# Patient Record
Sex: MALE | Race: WHITE | NOT HISPANIC OR LATINO | Employment: OTHER | URBAN - METROPOLITAN AREA
[De-identification: names, ages, dates, MRNs, and addresses within clinical notes are randomized per-mention and may not be internally consistent; named-entity substitution may affect disease eponyms.]

---

## 2017-05-05 ENCOUNTER — OFFICE VISIT (OUTPATIENT)
Dept: INTERNAL MEDICINE | Facility: CLINIC | Age: 62
End: 2017-05-05

## 2017-05-05 VITALS
WEIGHT: 237 LBS | TEMPERATURE: 98.1 F | BODY MASS INDEX: 29.47 KG/M2 | HEART RATE: 90 BPM | RESPIRATION RATE: 16 BRPM | DIASTOLIC BLOOD PRESSURE: 90 MMHG | SYSTOLIC BLOOD PRESSURE: 150 MMHG | HEIGHT: 75 IN

## 2017-05-05 DIAGNOSIS — Z71.85 IMMUNIZATION COUNSELING: ICD-10-CM

## 2017-05-05 DIAGNOSIS — M79.672 LEFT FOOT PAIN: ICD-10-CM

## 2017-05-05 DIAGNOSIS — Z79.899 ENCOUNTER FOR LONG-TERM (CURRENT) USE OF HIGH-RISK MEDICATION: ICD-10-CM
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
## 2017-05-05 DIAGNOSIS — F41.9 ANXIETY: ICD-10-CM

## 2017-05-05 DIAGNOSIS — M06.9 RHEUMATOID ARTHRITIS, INVOLVING UNSPECIFIED SITE, UNSPECIFIED RHEUMATOID FACTOR PRESENCE: ICD-10-CM

## 2017-05-05 DIAGNOSIS — IMO0001 ELEVATED BLOOD PRESSURE: ICD-10-CM

## 2017-05-05 DIAGNOSIS — L55.9 SUNBURN: ICD-10-CM

## 2017-05-05 DIAGNOSIS — M79.7 FIBROMYALGIA: ICD-10-CM

## 2017-05-05 DIAGNOSIS — Z23 IMMUNIZATION DUE: ICD-10-CM

## 2017-05-05 DIAGNOSIS — F32.A DEPRESSION, UNSPECIFIED DEPRESSION TYPE: ICD-10-CM

## 2017-05-05 DIAGNOSIS — G44.309 POST-TRAUMATIC HEADACHE, NOT INTRACTABLE, UNSPECIFIED CHRONICITY PATTERN: ICD-10-CM

## 2017-05-05 DIAGNOSIS — Z11.59 NEED FOR HEPATITIS C SCREENING TEST: ICD-10-CM

## 2017-05-05 DIAGNOSIS — E66.9 OBESITY, UNSPECIFIED OBESITY SEVERITY, UNSPECIFIED OBESITY TYPE: Primary | ICD-10-CM

## 2017-05-05 DIAGNOSIS — M54.5 CHRONIC LOW BACK PAIN, UNSPECIFIED BACK PAIN LATERALITY, WITH SCIATICA PRESENCE UNSPECIFIED: ICD-10-CM

## 2017-05-05 DIAGNOSIS — G89.29 CHRONIC LOW BACK PAIN, UNSPECIFIED BACK PAIN LATERALITY, WITH SCIATICA PRESENCE UNSPECIFIED: ICD-10-CM

## 2017-05-05 LAB
ALBUMIN SERPL-MCNC: 4.6 G/DL (ref 3.2–4.8)
ALBUMIN/GLOB SERPL: 1.4 G/DL (ref 1.5–2.5)
ALP SERPL-CCNC: 112 U/L (ref 25–100)
ALT SERPL W P-5'-P-CCNC: 87 U/L (ref 7–40)
ANION GAP SERPL CALCULATED.3IONS-SCNC: 9 MMOL/L (ref 3–11)
ARTICHOKE IGE QN: 132 MG/DL (ref 0–130)
AST SERPL-CCNC: 51 U/L (ref 0–33)
BASOPHILS # BLD AUTO: 0.02 10*3/MM3 (ref 0–0.2)
BASOPHILS NFR BLD AUTO: 0.2 % (ref 0–1)
BILIRUB BLD-MCNC: NEGATIVE MG/DL
BILIRUB SERPL-MCNC: 0.3 MG/DL (ref 0.3–1.2)
BUN BLD-MCNC: 13 MG/DL (ref 9–23)
BUN/CREAT SERPL: 14.4 (ref 7–25)
CALCIUM SPEC-SCNC: 10.3 MG/DL (ref 8.7–10.4)
CHLORIDE SERPL-SCNC: 96 MMOL/L (ref 99–109)
CHOLEST SERPL-MCNC: 204 MG/DL (ref 0–200)
CLARITY, POC: CLEAR
CO2 SERPL-SCNC: 34 MMOL/L (ref 20–31)
COLOR UR: YELLOW
CREAT BLD-MCNC: 0.9 MG/DL (ref 0.6–1.3)
DEPRECATED RDW RBC AUTO: 50.5 FL (ref 37–54)
EOSINOPHIL # BLD AUTO: 0.63 10*3/MM3 (ref 0.1–0.3)
EOSINOPHIL NFR BLD AUTO: 7 % (ref 0–3)
ERYTHROCYTE [DISTWIDTH] IN BLOOD BY AUTOMATED COUNT: 14.2 % (ref 11.3–14.5)
EXPIRATION DATE: NORMAL
GFR SERPL CREATININE-BSD FRML MDRD: 86 ML/MIN/1.73
GLOBULIN UR ELPH-MCNC: 3.3 GM/DL
GLUCOSE BLD-MCNC: 99 MG/DL (ref 70–100)
GLUCOSE UR STRIP-MCNC: NEGATIVE MG/DL
HCT VFR BLD AUTO: 41.3 % (ref 38.9–50.9)
HCV AB SER DONR QL: NORMAL
HDLC SERPL-MCNC: 40 MG/DL (ref 40–60)
HGB BLD-MCNC: 13.2 G/DL (ref 13.1–17.5)
IMM GRANULOCYTES # BLD: 0.02 10*3/MM3 (ref 0–0.03)
IMM GRANULOCYTES NFR BLD: 0.2 % (ref 0–0.6)
KETONES UR QL: NEGATIVE
LEUKOCYTE EST, POC: NEGATIVE
LYMPHOCYTES # BLD AUTO: 2.95 10*3/MM3 (ref 0.6–4.8)
LYMPHOCYTES NFR BLD AUTO: 32.9 % (ref 24–44)
Lab: NORMAL
MCH RBC QN AUTO: 31.4 PG (ref 27–31)
MCHC RBC AUTO-ENTMCNC: 32 G/DL (ref 32–36)
MCV RBC AUTO: 98.3 FL (ref 80–99)
MONOCYTES # BLD AUTO: 1.23 10*3/MM3 (ref 0–1)
MONOCYTES NFR BLD AUTO: 13.7 % (ref 0–12)
NEUTROPHILS # BLD AUTO: 4.12 10*3/MM3 (ref 1.5–8.3)
NEUTROPHILS NFR BLD AUTO: 46 % (ref 41–71)
NITRITE UR-MCNC: NEGATIVE MG/ML
PH UR: 5 [PH] (ref 5–8)
PLATELET # BLD AUTO: 387 10*3/MM3 (ref 150–450)
PMV BLD AUTO: 11.2 FL (ref 6–12)
POTASSIUM BLD-SCNC: 4.5 MMOL/L (ref 3.5–5.5)
PROT SERPL-MCNC: 7.9 G/DL (ref 5.7–8.2)
PROT UR STRIP-MCNC: NEGATIVE MG/DL
RBC # BLD AUTO: 4.2 10*6/MM3 (ref 4.2–5.76)
RBC # UR STRIP: NEGATIVE /UL
SODIUM BLD-SCNC: 139 MMOL/L (ref 132–146)
SP GR UR: 1.01 (ref 1–1.03)
TRIGL SERPL-MCNC: 383 MG/DL (ref 0–150)
UROBILINOGEN UR QL: NORMAL
WBC NRBC COR # BLD: 8.97 10*3/MM3 (ref 3.5–10.8)

## 2017-05-05 PROCEDURE — 86803 HEPATITIS C AB TEST: CPT | Performed by: NURSE PRACTITIONER

## 2017-05-05 PROCEDURE — 99406 BEHAV CHNG SMOKING 3-10 MIN: CPT | Performed by: NURSE PRACTITIONER

## 2017-05-05 PROCEDURE — 80053 COMPREHEN METABOLIC PANEL: CPT | Performed by: NURSE PRACTITIONER

## 2017-05-05 PROCEDURE — 81003 URINALYSIS AUTO W/O SCOPE: CPT | Performed by: NURSE PRACTITIONER

## 2017-05-05 PROCEDURE — G0009 ADMIN PNEUMOCOCCAL VACCINE: HCPCS | Performed by: NURSE PRACTITIONER

## 2017-05-05 PROCEDURE — 80061 LIPID PANEL: CPT | Performed by: NURSE PRACTITIONER

## 2017-05-05 PROCEDURE — 90732 PPSV23 VACC 2 YRS+ SUBQ/IM: CPT | Performed by: NURSE PRACTITIONER

## 2017-05-05 PROCEDURE — 85025 COMPLETE CBC W/AUTO DIFF WBC: CPT | Performed by: NURSE PRACTITIONER

## 2017-05-05 PROCEDURE — 36415 COLL VENOUS BLD VENIPUNCTURE: CPT | Performed by: NURSE PRACTITIONER

## 2017-05-05 PROCEDURE — 99205 OFFICE O/P NEW HI 60 MIN: CPT | Performed by: NURSE PRACTITIONER

## 2017-05-05 RX ORDER — PREGABALIN 200 MG/1
200 CAPSULE ORAL 3 TIMES DAILY
COMMUNITY
End: 2017-06-01 | Stop reason: SDUPTHER

## 2017-05-05 RX ORDER — ONDANSETRON HYDROCHLORIDE 8 MG/1
8 TABLET, FILM COATED ORAL EVERY 12 HOURS PRN
COMMUNITY
End: 2017-06-01 | Stop reason: SDUPTHER

## 2017-05-05 RX ORDER — OLANZAPINE 10 MG/1
10 TABLET ORAL DAILY
Refills: 0 | COMMUNITY
Start: 2017-05-03 | End: 2017-08-08 | Stop reason: SDUPTHER

## 2017-05-05 RX ORDER — AMLODIPINE BESYLATE 5 MG/1
5 TABLET ORAL DAILY
Refills: 0 | COMMUNITY
Start: 2017-05-03 | End: 2017-10-10 | Stop reason: SDUPTHER

## 2017-05-05 RX ORDER — IBUPROFEN 800 MG/1
800 TABLET ORAL DAILY
Refills: 0 | COMMUNITY
Start: 2017-05-03 | End: 2017-06-01 | Stop reason: SDUPTHER

## 2017-05-05 RX ORDER — AMITRIPTYLINE HYDROCHLORIDE 100 MG/1
100 TABLET, FILM COATED ORAL DAILY
Refills: 1 | COMMUNITY
Start: 2017-05-03 | End: 2017-11-02

## 2017-05-05 RX ORDER — GABAPENTIN 800 MG/1
800 TABLET ORAL DAILY
Refills: 0 | COMMUNITY
Start: 2017-05-03 | End: 2017-07-31 | Stop reason: SDUPTHER

## 2017-05-05 RX ORDER — TRAMADOL HYDROCHLORIDE 50 MG/1
50 TABLET ORAL DAILY
Refills: 0 | COMMUNITY
Start: 2017-05-03

## 2017-05-05 RX ORDER — OMEPRAZOLE 20 MG/1
20 CAPSULE, DELAYED RELEASE ORAL DAILY
Refills: 1 | COMMUNITY
Start: 2017-05-03 | End: 2018-04-06 | Stop reason: SDUPTHER

## 2017-05-05 RX ORDER — CELECOXIB 200 MG/1
200 CAPSULE ORAL DAILY
Refills: 1 | COMMUNITY
Start: 2017-05-03

## 2017-05-05 RX ORDER — TIZANIDINE 4 MG/1
4 TABLET ORAL DAILY
Refills: 0 | COMMUNITY
Start: 2017-05-03

## 2017-05-05 RX ORDER — HYDROCHLOROTHIAZIDE 25 MG/1
25 TABLET ORAL DAILY
Refills: 0 | COMMUNITY
Start: 2017-05-03 | End: 2017-06-01 | Stop reason: SDUPTHER

## 2017-05-05 RX ORDER — HYDROXYZINE 50 MG/1
50 TABLET, FILM COATED ORAL DAILY
Refills: 0 | COMMUNITY
Start: 2017-05-03 | End: 2018-04-02 | Stop reason: SDUPTHER

## 2017-05-08 ENCOUNTER — TELEPHONE (OUTPATIENT)
Dept: INTERNAL MEDICINE | Facility: CLINIC | Age: 62
End: 2017-05-08

## 2017-05-16 DIAGNOSIS — E78.5 HYPERLIPIDEMIA, UNSPECIFIED HYPERLIPIDEMIA TYPE: Primary | ICD-10-CM

## 2017-05-16 DIAGNOSIS — R74.8 ELEVATED LIVER ENZYMES: Primary | ICD-10-CM

## 2017-05-18 ENCOUNTER — HOSPITAL ENCOUNTER (OUTPATIENT)
Dept: CT IMAGING | Facility: HOSPITAL | Age: 62
End: 2017-05-18

## 2017-05-31 ENCOUNTER — TELEPHONE (OUTPATIENT)
Dept: INTERNAL MEDICINE | Facility: CLINIC | Age: 62
End: 2017-05-31

## 2017-06-01 RX ORDER — ONDANSETRON HYDROCHLORIDE 8 MG/1
8 TABLET, FILM COATED ORAL EVERY 12 HOURS PRN
Qty: 30 TABLET | Refills: 2 | Status: SHIPPED | OUTPATIENT
Start: 2017-06-01 | End: 2017-10-09 | Stop reason: SDUPTHER

## 2017-06-01 RX ORDER — HYDROCHLOROTHIAZIDE 25 MG/1
25 TABLET ORAL DAILY
Qty: 30 TABLET | Refills: 2 | Status: SHIPPED | OUTPATIENT
Start: 2017-06-01 | End: 2017-08-30 | Stop reason: SDUPTHER

## 2017-06-01 RX ORDER — IBUPROFEN 800 MG/1
800 TABLET ORAL DAILY
Qty: 30 TABLET | Refills: 2 | Status: SHIPPED | OUTPATIENT
Start: 2017-06-01 | End: 2017-06-08 | Stop reason: SDUPTHER

## 2017-06-01 RX ORDER — PREGABALIN 200 MG/1
200 CAPSULE ORAL 3 TIMES DAILY
Qty: 90 CAPSULE | Refills: 2 | Status: SHIPPED | OUTPATIENT
Start: 2017-06-01 | End: 2017-06-02 | Stop reason: SDUPTHER

## 2017-06-01 NOTE — TELEPHONE ENCOUNTER
LMOV for return call. Let pt know that Rx's were sent to requested pharm. Office # given if any questions.

## 2017-06-02 ENCOUNTER — TELEPHONE (OUTPATIENT)
Dept: INTERNAL MEDICINE | Facility: CLINIC | Age: 62
End: 2017-06-02

## 2017-06-02 RX ORDER — PREGABALIN 200 MG/1
200 CAPSULE ORAL 3 TIMES DAILY
Qty: 90 CAPSULE | Refills: 2 | OUTPATIENT
Start: 2017-06-02 | End: 2017-08-24 | Stop reason: SDUPTHER

## 2017-06-02 NOTE — TELEPHONE ENCOUNTER
----- Message from Allyssa Klein sent at 6/2/2017  8:35 AM EDT -----  [phone]    [facility] DOES NOT HAVE LYRICA-PLEASE RESEND

## 2017-06-02 NOTE — TELEPHONE ENCOUNTER
Rx called into pharm. Spoke with Yomaira. JUNIOR letting pt know. Office # given if any further questions.

## 2017-06-05 ENCOUNTER — APPOINTMENT (OUTPATIENT)
Dept: CT IMAGING | Facility: HOSPITAL | Age: 62
End: 2017-06-05

## 2017-06-08 ENCOUNTER — APPOINTMENT (OUTPATIENT)
Dept: CT IMAGING | Facility: HOSPITAL | Age: 62
End: 2017-06-08

## 2017-06-08 ENCOUNTER — TELEPHONE (OUTPATIENT)
Dept: INTERNAL MEDICINE | Facility: CLINIC | Age: 62
End: 2017-06-08

## 2017-06-08 RX ORDER — IBUPROFEN 800 MG/1
800 TABLET ORAL EVERY 8 HOURS PRN
Qty: 90 TABLET | Refills: 0 | Status: SHIPPED | OUTPATIENT
Start: 2017-06-08 | End: 2017-07-05 | Stop reason: SDUPTHER

## 2017-06-08 NOTE — TELEPHONE ENCOUNTER
Ibuprofen 800 mg 1tab 3 times a day when necessary pain.  Please ask that he take this medicine with food and caution and this should be on a temporary basis only as it can cause side effects of gastritis.  Numbers 90 no refills.

## 2017-06-08 NOTE — TELEPHONE ENCOUNTER
----- Message from Allyssa Klein sent at 6/8/2017  9:20 AM EDT -----  [phone]    NEEDS IBUPROFEN 300 MG 3X DAY.  RECENT RX WAS ONLY FOR 1X DAY AND HE IS OUT    [facility]

## 2017-07-05 RX ORDER — IBUPROFEN 800 MG/1
800 TABLET ORAL EVERY 8 HOURS PRN
Qty: 90 TABLET | Refills: 0 | Status: SHIPPED | OUTPATIENT
Start: 2017-07-05 | End: 2017-08-02 | Stop reason: SDUPTHER

## 2017-07-06 ENCOUNTER — OFFICE VISIT (OUTPATIENT)
Dept: INTERNAL MEDICINE | Facility: CLINIC | Age: 62
End: 2017-07-06

## 2017-07-06 VITALS
BODY MASS INDEX: 29.62 KG/M2 | TEMPERATURE: 97.7 F | RESPIRATION RATE: 16 BRPM | WEIGHT: 237 LBS | HEART RATE: 84 BPM | SYSTOLIC BLOOD PRESSURE: 130 MMHG | DIASTOLIC BLOOD PRESSURE: 86 MMHG

## 2017-07-06 DIAGNOSIS — R74.8 ELEVATED LIVER ENZYMES: ICD-10-CM

## 2017-07-06 DIAGNOSIS — R25.1 TREMORS OF NERVOUS SYSTEM: Primary | ICD-10-CM

## 2017-07-06 DIAGNOSIS — M79.672 LEFT FOOT PAIN: ICD-10-CM

## 2017-07-06 PROCEDURE — 99213 OFFICE O/P EST LOW 20 MIN: CPT | Performed by: NURSE PRACTITIONER

## 2017-07-06 NOTE — PROGRESS NOTES
Chief Complaint   Patient presents with   • Foot Injury     [facility] f/u        Subjective     History of Present Illness   The patient is here today for follow-up of her recent ER visit.  Patient did not remember that he was seen approximately one month ago for follow-up of left foot injury.  Records were reviewed and does have a fracture of his fifth metatarsal he has yet to see orthopedics because he says he can't get a ride.  He also reports that with orthopedics but they canceled due to the them having computer problems.  He would like pain medication today for his foot.  He reports he can take Motrin due to history of irritation in his stomach.  He has had orders for pain management however does not like pain management and does not want to go see them.      The following portions of the patient's history were reviewed and updated as appropriate: allergies, current medications, past family history, past medical history, past social history, past surgical history and problem list.    Review of Systems  No headache dizziness visual changes fever chest pain shortness of breath swelling no abdominal pain nausea vomiting diarrhea he does feel tremulous at times has pain in his left foot and chronic pain in his back no rashes no new lumps or bumps  Objective   Physical Exam   Constitutional: He is oriented to person, place, and time. He appears well-developed and well-nourished.   HENT:   Head: Normocephalic and atraumatic.   Neck: No thyromegaly present.   Cardiovascular: Normal rate, regular rhythm, normal heart sounds and intact distal pulses.    No murmur heard.  Pulmonary/Chest: Effort normal and breath sounds normal.   Abdominal: Soft. Bowel sounds are normal.   Musculoskeletal: Normal range of motion.   ttp Lateral 5th metatarsal L foot no erythema warmth edema   Neurological: He is alert and oriented to person, place, and time.   Skin: Skin is warm and dry.   Psychiatric: He has a normal mood and affect.  His behavior is normal.   Nursing note and vitals reviewed.      Results for orders placed or performed in visit on 05/05/17   Comprehensive Metabolic Panel   Result Value Ref Range    Glucose 99 70 - 100 mg/dL    BUN 13 9 - 23 mg/dL    Creatinine 0.90 0.60 - 1.30 mg/dL    Sodium 139 132 - 146 mmol/L    Potassium 4.5 3.5 - 5.5 mmol/L    Chloride 96 (L) 99 - 109 mmol/L    CO2 34.0 (H) 20.0 - 31.0 mmol/L    Calcium 10.3 8.7 - 10.4 mg/dL    Total Protein 7.9 5.7 - 8.2 g/dL    Albumin 4.60 3.20 - 4.80 g/dL    ALT (SGPT) 87 (H) 7 - 40 U/L    AST (SGOT) 51 (H) 0 - 33 U/L    Alkaline Phosphatase 112 (H) 25 - 100 U/L    Total Bilirubin 0.3 0.3 - 1.2 mg/dL    eGFR Non African Amer 86 >60 mL/min/1.73    Globulin 3.3 gm/dL    A/G Ratio 1.4 (L) 1.5 - 2.5 g/dL    BUN/Creatinine Ratio 14.4 7.0 - 25.0    Anion Gap 9.0 3.0 - 11.0 mmol/L   Lipid Panel   Result Value Ref Range    Total Cholesterol 204 (H) 0 - 200 mg/dL    Triglycerides 383 (H) 0 - 150 mg/dL    HDL Cholesterol 40 40 - 60 mg/dL    LDL Cholesterol  132 (H) 0 - 130 mg/dL   Hepatitis C Antibody   Result Value Ref Range    Hepatitis C Ab Non-Reactive Non-Reactive   CBC Auto Differential   Result Value Ref Range    WBC 8.97 3.50 - 10.80 10*3/mm3    RBC 4.20 4.20 - 5.76 10*6/mm3    Hemoglobin 13.2 13.1 - 17.5 g/dL    Hematocrit 41.3 38.9 - 50.9 %    MCV 98.3 80.0 - 99.0 fL    MCH 31.4 (H) 27.0 - 31.0 pg    MCHC 32.0 32.0 - 36.0 g/dL    RDW 14.2 11.3 - 14.5 %    RDW-SD 50.5 37.0 - 54.0 fl    MPV 11.2 6.0 - 12.0 fL    Platelets 387 150 - 450 10*3/mm3    Neutrophil % 46.0 41.0 - 71.0 %    Lymphocyte % 32.9 24.0 - 44.0 %    Monocyte % 13.7 (H) 0.0 - 12.0 %    Eosinophil % 7.0 (H) 0.0 - 3.0 %    Basophil % 0.2 0.0 - 1.0 %    Immature Grans % 0.2 0.0 - 0.6 %    Neutrophils, Absolute 4.12 1.50 - 8.30 10*3/mm3    Lymphocytes, Absolute 2.95 0.60 - 4.80 10*3/mm3    Monocytes, Absolute 1.23 (H) 0.00 - 1.00 10*3/mm3    Eosinophils, Absolute 0.63 (H) 0.10 - 0.30 10*3/mm3    Basophils,  Absolute 0.02 0.00 - 0.20 10*3/mm3    Immature Grans, Absolute 0.02 0.00 - 0.03 10*3/mm3   POC Urinalysis Dipstick, Automated   Result Value Ref Range    Color Yellow Yellow, Straw, Dark Yellow, Liberty    Clarity, UA Clear Clear    Glucose, UA Negative Negative, 1000 mg/dL (3+) mg/dL    Bilirubin Negative Negative    Ketones, UA Negative Negative    Specific Gravity  1.015 1.005 - 1.030    Blood, UA Negative Negative    pH, Urine 5.0 5.0 - 8.0    Protein, POC Negative Negative mg/dL    Urobilinogen, UA Normal Normal    Leukocytes Negative Negative    Nitrite, UA Negative Negative    Lot Number [id number]     Expiration Date 2282018         Assessment/Plan   Nathalia was seen today for foot injury.    Diagnoses and all orders for this visit:    Tremors of nervous system  -     Ambulatory Referral to Neurology    Elevated liver enzymes    Left foot pain  -     Ambulatory Referral to Orthopedic Surgery    note to have neurontin called in one month only      Follow up 3mo RTC/call  If symptoms worsen  Meds MOA and SE's reviewed and pt v/u

## 2017-07-20 ENCOUNTER — TELEPHONE (OUTPATIENT)
Dept: INTERNAL MEDICINE | Facility: CLINIC | Age: 62
End: 2017-07-20

## 2017-08-02 RX ORDER — IBUPROFEN 800 MG/1
800 TABLET ORAL EVERY 8 HOURS PRN
Qty: 90 TABLET | Refills: 0 | Status: SHIPPED | OUTPATIENT
Start: 2017-08-02 | End: 2017-08-30 | Stop reason: SDUPTHER

## 2017-08-03 DIAGNOSIS — Z79.899 ENCOUNTER FOR LONG-TERM (CURRENT) USE OF HIGH-RISK MEDICATION: Primary | ICD-10-CM

## 2017-08-03 RX ORDER — GABAPENTIN 800 MG/1
TABLET ORAL
Qty: 30 TABLET | Refills: 0 | Status: SHIPPED | OUTPATIENT
Start: 2017-08-03 | End: 2017-10-10

## 2017-08-03 NOTE — TELEPHONE ENCOUNTER
Spoke with Jaquelin at [facility]. States that pt filled #30 on 07/06/2017.     LMOV for return call. Office # given. Just need to know when pt sees Pain Management again.

## 2017-08-04 NOTE — TELEPHONE ENCOUNTER
LMOV for return call. Office # given. Need to let pt know that he will need to come in to office to sign CSA. Placed up front for pt to complete.     * Pt will also need UDS when he comes in to sign CSA.

## 2017-08-08 RX ORDER — OLANZAPINE 10 MG/1
10 TABLET ORAL DAILY
Qty: 30 TABLET | Refills: 2 | Status: SHIPPED | OUTPATIENT
Start: 2017-08-08 | End: 2017-10-10

## 2017-08-09 ENCOUNTER — TELEPHONE (OUTPATIENT)
Dept: INTERNAL MEDICINE | Facility: CLINIC | Age: 62
End: 2017-08-09
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
## 2017-08-09 NOTE — TELEPHONE ENCOUNTER
If it blistered then he may have a second degree burn which is going to be painful for 3-5 days.  Recommend using silvadene cream which is specifically for burns.  Stop calamine.  If there is expanding redness then he needs to be seen.  Keep clean and dry.

## 2017-08-14 ENCOUNTER — OFFICE VISIT (OUTPATIENT)
Dept: NEUROLOGY | Facility: CLINIC | Age: 62
End: 2017-08-14

## 2017-08-14 VITALS
WEIGHT: 231 LBS | HEIGHT: 75 IN | BODY MASS INDEX: 28.72 KG/M2 | DIASTOLIC BLOOD PRESSURE: 85 MMHG | SYSTOLIC BLOOD PRESSURE: 128 MMHG | HEART RATE: 96 BPM

## 2017-08-14 DIAGNOSIS — R25.1 OCCASIONAL TREMORS: Primary | ICD-10-CM

## 2017-08-14 PROCEDURE — 99203 OFFICE O/P NEW LOW 30 MIN: CPT | Performed by: PSYCHIATRY & NEUROLOGY

## 2017-08-14 NOTE — PROGRESS NOTES
Subjective:     Patient ID: Nathalia Jordan is a 62 y.o. male.    History of Present Illness     62 y.o. male referred by Lady Sanchez for tremors.  In last year developed intermittent tremors in left arm.  Trouble walking with left leg swinging outwards.  Taking Zyprexa for the last year and ran out two weeks ago.  Pain in feet and knees with walking.         The following portions of the patient's history were reviewed and updated as appropriate:   He  has a past medical history of Anxiety; Depression; Fibromyalgia, primary; Hypertension; and Rheumatoid arthritis.  He  does not have any pertinent problems on file.  He  has a past surgical history that includes Cyst Removal (Right, 1999).  His family history includes Stomach cancer in his mother.  He  reports that he quit smoking about 6 years ago. His smoking use included Cigarettes. He has a 10.00 pack-year smoking history. His smokeless tobacco use includes Snuff. He reports that he does not drink alcohol or use illicit drugs.  Current Outpatient Prescriptions   Medication Sig Dispense Refill   • amitriptyline (ELAVIL) 100 MG tablet Take 100 mg by mouth Daily.  1   • amLODIPine (NORVASC) 5 MG tablet Take 5 mg by mouth Daily.  0   • celecoxib (CeleBREX) 200 MG capsule Take 200 mg by mouth Daily.  1   • gabapentin (NEURONTIN) 800 MG tablet take 1 tablet by mouth once daily 30 tablet 0   • hydrochlorothiazide (HYDRODIURIL) 25 MG tablet Take 1 tablet by mouth Daily. 30 tablet 2   • hydrOXYzine (ATARAX) 50 MG tablet Take 50 mg by mouth Daily.  0   • ibuprofen (ADVIL,MOTRIN) 800 MG tablet Take 1 tablet by mouth Every 8 (Eight) Hours As Needed for Mild Pain (1-3). 90 tablet 0   • OLANZapine (zyPREXA) 10 MG tablet Take 1 tablet by mouth Daily. 30 tablet 2   • omeprazole (priLOSEC) 20 MG capsule Take 20 mg by mouth Daily.  1   • ondansetron (ZOFRAN) 8 MG tablet Take 1 tablet by mouth Every 12 (Twelve) Hours As Needed for Nausea or Vomiting. 30 tablet 2   • silver  sulfadiazine (SILVADENE, SSD) 1 % cream Apply  topically 2 (Two) Times a Day. 25 g 0   • tiZANidine (ZANAFLEX) 4 MG tablet Take 4 mg by mouth Daily.  0   • traMADol (ULTRAM) 50 MG tablet Take 50 mg by mouth Daily.  0   • LYRICA 200 MG capsule take 1 capsule by mouth three times a day 90 capsule 1     No current facility-administered medications for this visit.      Current Outpatient Prescriptions on File Prior to Visit   Medication Sig   • amitriptyline (ELAVIL) 100 MG tablet Take 100 mg by mouth Daily.   • amLODIPine (NORVASC) 5 MG tablet Take 5 mg by mouth Daily.   • celecoxib (CeleBREX) 200 MG capsule Take 200 mg by mouth Daily.   • gabapentin (NEURONTIN) 800 MG tablet take 1 tablet by mouth once daily   • hydrochlorothiazide (HYDRODIURIL) 25 MG tablet Take 1 tablet by mouth Daily.   • hydrOXYzine (ATARAX) 50 MG tablet Take 50 mg by mouth Daily.   • ibuprofen (ADVIL,MOTRIN) 800 MG tablet Take 1 tablet by mouth Every 8 (Eight) Hours As Needed for Mild Pain (1-3).   • OLANZapine (zyPREXA) 10 MG tablet Take 1 tablet by mouth Daily.   • omeprazole (priLOSEC) 20 MG capsule Take 20 mg by mouth Daily.   • ondansetron (ZOFRAN) 8 MG tablet Take 1 tablet by mouth Every 12 (Twelve) Hours As Needed for Nausea or Vomiting.   • silver sulfadiazine (SILVADENE, SSD) 1 % cream Apply  topically 2 (Two) Times a Day.   • tiZANidine (ZANAFLEX) 4 MG tablet Take 4 mg by mouth Daily.   • traMADol (ULTRAM) 50 MG tablet Take 50 mg by mouth Daily.     No current facility-administered medications on file prior to visit.      He has No Known Allergies..    Review of Systems   Constitutional: Negative for activity change, fatigue and unexpected weight change.   HENT: Negative for facial swelling, hearing loss, tinnitus, trouble swallowing and voice change.    Eyes: Negative for photophobia, pain and visual disturbance.   Respiratory: Negative for apnea, cough and choking.    Cardiovascular: Negative for chest pain.   Gastrointestinal:  "Negative for constipation, nausea and vomiting.   Endocrine: Negative for cold intolerance.   Genitourinary: Negative for difficulty urinating, frequency and urgency.   Musculoskeletal: Positive for arthralgias and back pain. Negative for gait problem, myalgias, neck pain and neck stiffness.   Skin: Negative for rash.   Allergic/Immunologic: Negative for immunocompromised state.   Neurological: Positive for tremors. Negative for dizziness, seizures, syncope, facial asymmetry, speech difficulty, weakness, light-headedness, numbness and headaches.   Hematological: Negative for adenopathy.   Psychiatric/Behavioral: Negative for confusion, decreased concentration, hallucinations and sleep disturbance. The patient is not nervous/anxious.         Objective:  Vitals:    08/14/17 1352   BP: 128/85   Pulse: 96   Weight: 231 lb (105 kg)   Height: 75\" (190.5 cm)       Neurologic Exam     Mental Status   Oriented to person, place, and time.   Registration: recalls 3 of 3 objects. Recall at 5 minutes: recalls 3 of 3 objects. Follows 3 step commands.   Attention: normal. Concentration: normal.   Speech: speech is normal   Level of consciousness: alert  Knowledge: good and consistent with education.   Able to name object. Able to read. Able to repeat. Able to write. Normal comprehension.     Cranial Nerves     CN II   Visual fields full to confrontation.   Visual acuity: normal  Right visual field deficit: none  Left visual field deficit: none     CN III, IV, VI   Pupils are equal, round, and reactive to light.  Extraocular motions are normal.   Right pupil: Shape: regular. Reactivity: brisk. Consensual response: intact.   Left pupil: Shape: regular. Reactivity: brisk. Consensual response: intact.   Nystagmus: none   Diplopia: none  Ophthalmoparesis: none  Upgaze: normal  Downgaze: normal  Conjugate gaze: present  Vestibulo-ocular reflex: present    CN V   Facial sensation intact.   Right corneal reflex: normal  Left corneal " reflex: normal    CN VII   Right facial weakness: none  Left facial weakness: none    CN VIII   Hearing: intact    CN IX, X   Palate: symmetric  Right gag reflex: normal  Left gag reflex: normal    CN XI   Right sternocleidomastoid strength: normal  Left sternocleidomastoid strength: normal    CN XII   Tongue: not atrophic  Fasciculations: absent  Tongue deviation: none       Decreased blink rate and masked face     Motor Exam   Muscle bulk: normal  Overall muscle tone: normal  Right arm tone: normal  Left arm tone: normal  Right leg tone: normal  Left leg tone: normal    Strength   Strength 5/5 throughout.     Sensory Exam   Light touch normal.   Vibration normal.   Proprioception normal.   Pinprick normal.     Gait, Coordination, and Reflexes     Gait  Gait: circumduction (left leg )    Coordination   Romberg: negative  Finger to nose coordination: normal  Heel to shin coordination: normal  Tandem walking coordination: abnormal    Tremor   Resting tremor: absent  Intention tremor: absent  Action tremor: absent    Reflexes   Reflexes 2+ except as noted.       Physical Exam   Constitutional: He is oriented to person, place, and time. Vital signs are normal. He appears well-developed and well-nourished.   HENT:   Head: Normocephalic and atraumatic.   Eyes: EOM and lids are normal. Pupils are equal, round, and reactive to light.   Fundoscopic exam:       The right eye shows no exudate, no hemorrhage and no papilledema. The right eye shows venous pulsations.        The left eye shows no exudate, no hemorrhage and no papilledema. The left eye shows venous pulsations.   Neck: Normal range of motion and phonation normal. Normal carotid pulses present. Carotid bruit is not present. No thyroid mass and no thyromegaly present.   Cardiovascular: Normal rate, regular rhythm and normal heart sounds.    Pulmonary/Chest: Effort normal.   Neurological: He is oriented to person, place, and time. He has normal strength. He has an  abnormal Tandem Gait Test. He has a normal Finger-Nose-Finger Test, a normal Heel to Simmons Test and a normal Romberg Test.   Skin: Skin is warm and dry.   Psychiatric: He has a normal mood and affect. His speech is normal.   Nursing note and vitals reviewed.      Assessment/Plan:       Problems Addressed this Visit        Other    Occasional tremors - Primary     Sx suggestive drug induced parkinsonism    Remain off Zyprexa

## 2017-08-30 RX ORDER — HYDROCHLOROTHIAZIDE 25 MG/1
25 TABLET ORAL DAILY
Qty: 30 TABLET | Refills: 2 | Status: SHIPPED | OUTPATIENT
Start: 2017-08-30 | End: 2017-10-09 | Stop reason: SDUPTHER

## 2017-08-30 RX ORDER — IBUPROFEN 800 MG/1
TABLET ORAL
Qty: 90 TABLET | Refills: 0 | Status: SHIPPED | OUTPATIENT
Start: 2017-08-30 | End: 2017-10-02 | Stop reason: SDUPTHER

## 2017-09-05 ENCOUNTER — TELEPHONE (OUTPATIENT)
Dept: INTERNAL MEDICINE | Facility: CLINIC | Age: 62
End: 2017-09-05

## 2017-09-05 NOTE — TELEPHONE ENCOUNTER
PT CALLED BACK-WAS TOLD HE NEEDED AN APPT BEFORE ANTIBIOTICS WOULD BE CALLED IN PER CANDACE-HE SAID HE DID NOT WANT TO GO OUT IN PUBLIC WITH HIS FACE SWOLLEN, THEN HE SAID THANK YOU AND HUNG UP

## 2017-09-05 NOTE — TELEPHONE ENCOUNTER
JUNIOR for return call. Office # given. Need to let him know that he will need to be seen before abx will be called in- Per Manju.

## 2017-09-05 NOTE — TELEPHONE ENCOUNTER
Pt informed that Abx will be sent tp pharm but he will have to take Motrin for pain control. Also informed that he will need to f/u with dentist, [facility] has programs if no dental insurance. Verbal understanding and great appreciation received.  Pt requested abx to be sent by 10:30am. I let him know that you are seeing pt's and would send in as soon as you are able to.       Pt requested rf on Gabapentin. I informed him that he will need to come in to office before it can be filled. Pt stated that he will try to come in sometime this month.    *Needs UDS.

## 2017-09-05 NOTE — TELEPHONE ENCOUNTER
----- Message from Silvina Freeman MA sent at 9/5/2017  9:03 AM EDT -----  Patient states he has two teeth bothering him (possibly abscessed) patient states he does not have dental insurance. Requesting an antibiotic.      [phone]

## 2017-09-06 ENCOUNTER — TELEPHONE (OUTPATIENT)
Dept: INTERNAL MEDICINE | Facility: CLINIC | Age: 62
End: 2017-09-06

## 2017-09-06 NOTE — TELEPHONE ENCOUNTER
Pt called back regarding this. He stated now his throat hurts and his chest hurts. He wanted to know if he could have an antibiotic called in now?

## 2017-09-06 NOTE — TELEPHONE ENCOUNTER
Patient had labs ordered on 5/16/2017. Reminder letter was mailed to patient 8/10/2017. Is it ok to cancel these orders? Please advise

## 2017-09-06 NOTE — TELEPHONE ENCOUNTER
CONNEROV letting pt know that he will need appt before abx will be sent to pharm. Office # given for return call to make appt.

## 2017-10-03 ENCOUNTER — TELEPHONE (OUTPATIENT)
Dept: INTERNAL MEDICINE | Facility: CLINIC | Age: 62
End: 2017-10-03

## 2017-10-03 RX ORDER — IBUPROFEN 800 MG/1
TABLET ORAL
Qty: 90 TABLET | Refills: 0 | Status: SHIPPED | OUTPATIENT
Start: 2017-10-03 | End: 2017-10-09 | Stop reason: SDUPTHER

## 2017-10-03 NOTE — TELEPHONE ENCOUNTER
----- Message from Fabiana Warren sent at 10/3/2017 12:27 PM EDT -----  Contact: SELF  WM PANG CALLING FOR A REFILL FOR IBUPROFEN 800 MG.  HE WOULD LIKE THIS SENT TO THE [facility] IN [place]  PH:[phone]    WM CAN BE REACHED -[phone]    HE SAID HE ONLY HAS 2 LEFT AND WOULD LIKE TO KNOW IF THIS COULD BE DONE TODAY, I HAVE NOTIFIED HIM THERE IS AN ALLOTTED 48 HOURS TO FILL A RX.

## 2017-10-03 NOTE — TELEPHONE ENCOUNTER
Okay to fill one month only patient was due to come back in office for recheck in October.  He also needs repeat check of his liver enzymes.

## 2017-10-09 ENCOUNTER — TELEPHONE (OUTPATIENT)
Dept: INTERNAL MEDICINE | Facility: CLINIC | Age: 62
End: 2017-10-09

## 2017-10-09 RX ORDER — HYDROCHLOROTHIAZIDE 25 MG/1
25 TABLET ORAL DAILY
Qty: 30 TABLET | Refills: 2 | Status: SHIPPED | OUTPATIENT
Start: 2017-10-09 | End: 2018-04-02 | Stop reason: SDUPTHER

## 2017-10-09 RX ORDER — IBUPROFEN 800 MG/1
800 TABLET ORAL EVERY 8 HOURS PRN
Qty: 90 TABLET | Refills: 0 | Status: SHIPPED | OUTPATIENT
Start: 2017-10-09 | End: 2017-11-02

## 2017-10-09 RX ORDER — ONDANSETRON HYDROCHLORIDE 8 MG/1
8 TABLET, FILM COATED ORAL EVERY 12 HOURS PRN
Qty: 30 TABLET | Refills: 2 | Status: SHIPPED | OUTPATIENT
Start: 2017-10-09

## 2017-10-09 RX ORDER — IBUPROFEN 800 MG/1
TABLET ORAL
Qty: 90 TABLET | Refills: 0 | OUTPATIENT
Start: 2017-10-09

## 2017-10-09 NOTE — TELEPHONE ENCOUNTER
----- Message from Fabiana Warren sent at 10/9/2017 10:02 AM EDT -----  Contact: SELF  WM PANG CALLING FOR REFILLS FOR HIS STOMACH PILL, IBUPROFEN AND BLOOD PRESSURE MEDICATION. HE USES THE [facility]. HE HAS ASKED IF THIS COULD BE CALLED IN SOON SO HE CAN GET IT TODAY WHILE HE HAS A RIDE. HE CAN BE REACHED [phone]

## 2017-10-09 NOTE — TELEPHONE ENCOUNTER
While the patient's controlled substance agreement, Keegan, urine drug screen are all up-to-date I was under the impression he was going to be following with pain management.  Consult was placed in May.  Please check on this with the patient he really should be seeing pain management for his pain control.

## 2017-10-10 ENCOUNTER — OFFICE VISIT (OUTPATIENT)
Dept: INTERNAL MEDICINE | Facility: CLINIC | Age: 62
End: 2017-10-10

## 2017-10-10 VITALS
SYSTOLIC BLOOD PRESSURE: 128 MMHG | BODY MASS INDEX: 29.62 KG/M2 | RESPIRATION RATE: 18 BRPM | HEART RATE: 72 BPM | TEMPERATURE: 97.9 F | WEIGHT: 237 LBS | DIASTOLIC BLOOD PRESSURE: 84 MMHG

## 2017-10-10 DIAGNOSIS — R74.8 ELEVATED LIVER ENZYMES: ICD-10-CM

## 2017-10-10 DIAGNOSIS — K21.9 GASTROESOPHAGEAL REFLUX DISEASE, ESOPHAGITIS PRESENCE NOT SPECIFIED: ICD-10-CM

## 2017-10-10 DIAGNOSIS — M54.5 CHRONIC LOW BACK PAIN, UNSPECIFIED BACK PAIN LATERALITY, WITH SCIATICA PRESENCE UNSPECIFIED: ICD-10-CM

## 2017-10-10 DIAGNOSIS — E66.9 OBESITY, UNSPECIFIED OBESITY SEVERITY, UNSPECIFIED OBESITY TYPE: ICD-10-CM

## 2017-10-10 DIAGNOSIS — I10 ESSENTIAL HYPERTENSION: ICD-10-CM

## 2017-10-10 DIAGNOSIS — F41.9 ANXIETY: ICD-10-CM

## 2017-10-10 DIAGNOSIS — G89.29 CHRONIC LOW BACK PAIN, UNSPECIFIED BACK PAIN LATERALITY, WITH SCIATICA PRESENCE UNSPECIFIED: ICD-10-CM

## 2017-10-10 DIAGNOSIS — Z79.899 LONG-TERM USE OF HIGH-RISK MEDICATION: Primary | ICD-10-CM

## 2017-10-10 LAB
ALBUMIN SERPL-MCNC: 4.3 G/DL (ref 3.2–4.8)
ALBUMIN/GLOB SERPL: 1.4 G/DL (ref 1.5–2.5)
ALP SERPL-CCNC: 89 U/L (ref 25–100)
ALT SERPL W P-5'-P-CCNC: 68 U/L (ref 7–40)
ANION GAP SERPL CALCULATED.3IONS-SCNC: 7 MMOL/L (ref 3–11)
ARTICHOKE IGE QN: 149 MG/DL (ref 0–130)
AST SERPL-CCNC: 37 U/L (ref 0–33)
BASOPHILS # BLD AUTO: 0.02 10*3/MM3 (ref 0–0.2)
BASOPHILS NFR BLD AUTO: 0.3 % (ref 0–1)
BILIRUB BLD-MCNC: NEGATIVE MG/DL
BILIRUB SERPL-MCNC: 0.3 MG/DL (ref 0.3–1.2)
BUN BLD-MCNC: 19 MG/DL (ref 9–23)
BUN/CREAT SERPL: 19 (ref 7–25)
CALCIUM SPEC-SCNC: 9.9 MG/DL (ref 8.7–10.4)
CHLORIDE SERPL-SCNC: 106 MMOL/L (ref 99–109)
CHOLEST SERPL-MCNC: 201 MG/DL (ref 0–200)
CLARITY, POC: CLEAR
CO2 SERPL-SCNC: 29 MMOL/L (ref 20–31)
COLOR UR: YELLOW
CREAT BLD-MCNC: 1 MG/DL (ref 0.6–1.3)
DEPRECATED RDW RBC AUTO: 49.8 FL (ref 37–54)
EOSINOPHIL # BLD AUTO: 0.52 10*3/MM3 (ref 0–0.3)
EOSINOPHIL NFR BLD AUTO: 6.6 % (ref 0–3)
ERYTHROCYTE [DISTWIDTH] IN BLOOD BY AUTOMATED COUNT: 14.7 % (ref 11.3–14.5)
EXPIRATION DATE: NORMAL
GFR SERPL CREATININE-BSD FRML MDRD: 76 ML/MIN/1.73
GLOBULIN UR ELPH-MCNC: 3.1 GM/DL
GLUCOSE BLD-MCNC: 101 MG/DL (ref 70–100)
GLUCOSE UR STRIP-MCNC: NEGATIVE MG/DL
HCT VFR BLD AUTO: 40.8 % (ref 38.9–50.9)
HDLC SERPL-MCNC: 48 MG/DL (ref 40–60)
HGB BLD-MCNC: 13.4 G/DL (ref 13.1–17.5)
IMM GRANULOCYTES # BLD: 0.03 10*3/MM3 (ref 0–0.03)
IMM GRANULOCYTES NFR BLD: 0.4 % (ref 0–0.6)
KETONES UR QL: NEGATIVE
LEUKOCYTE EST, POC: NEGATIVE
LYMPHOCYTES # BLD AUTO: 3.21 10*3/MM3 (ref 0.6–4.8)
LYMPHOCYTES NFR BLD AUTO: 40.7 % (ref 24–44)
Lab: NORMAL
MCH RBC QN AUTO: 30.7 PG (ref 27–31)
MCHC RBC AUTO-ENTMCNC: 32.8 G/DL (ref 32–36)
MCV RBC AUTO: 93.4 FL (ref 80–99)
MONOCYTES # BLD AUTO: 0.67 10*3/MM3 (ref 0–1)
MONOCYTES NFR BLD AUTO: 8.5 % (ref 0–12)
NEUTROPHILS # BLD AUTO: 3.44 10*3/MM3 (ref 1.5–8.3)
NEUTROPHILS NFR BLD AUTO: 43.5 % (ref 41–71)
NITRITE UR-MCNC: NEGATIVE MG/ML
PH UR: 5 [PH] (ref 5–8)
PLATELET # BLD AUTO: 280 10*3/MM3 (ref 150–450)
PMV BLD AUTO: 11.9 FL (ref 6–12)
POTASSIUM BLD-SCNC: 4.5 MMOL/L (ref 3.5–5.5)
PROT SERPL-MCNC: 7.4 G/DL (ref 5.7–8.2)
PROT UR STRIP-MCNC: NEGATIVE MG/DL
RBC # BLD AUTO: 4.37 10*6/MM3 (ref 4.2–5.76)
RBC # UR STRIP: NEGATIVE /UL
SODIUM BLD-SCNC: 142 MMOL/L (ref 132–146)
SP GR UR: 1.01 (ref 1–1.03)
TRIGL SERPL-MCNC: 186 MG/DL (ref 0–150)
UROBILINOGEN UR QL: NORMAL
WBC NRBC COR # BLD: 7.89 10*3/MM3 (ref 3.5–10.8)

## 2017-10-10 PROCEDURE — 80053 COMPREHEN METABOLIC PANEL: CPT | Performed by: NURSE PRACTITIONER

## 2017-10-10 PROCEDURE — 99214 OFFICE O/P EST MOD 30 MIN: CPT | Performed by: NURSE PRACTITIONER

## 2017-10-10 PROCEDURE — 85025 COMPLETE CBC W/AUTO DIFF WBC: CPT | Performed by: NURSE PRACTITIONER

## 2017-10-10 PROCEDURE — 81003 URINALYSIS AUTO W/O SCOPE: CPT | Performed by: NURSE PRACTITIONER

## 2017-10-10 PROCEDURE — 80061 LIPID PANEL: CPT | Performed by: NURSE PRACTITIONER

## 2017-10-10 PROCEDURE — 36415 COLL VENOUS BLD VENIPUNCTURE: CPT | Performed by: NURSE PRACTITIONER

## 2017-10-10 RX ORDER — PREGABALIN 200 MG/1
200 CAPSULE ORAL DAILY
Qty: 7 CAPSULE | Refills: 0 | OUTPATIENT
Start: 2017-10-10

## 2017-10-10 RX ORDER — AMLODIPINE BESYLATE 5 MG/1
5 TABLET ORAL DAILY
Qty: 30 TABLET | Refills: 0 | Status: SHIPPED | OUTPATIENT
Start: 2017-10-10 | End: 2017-11-27 | Stop reason: SDUPTHER

## 2017-10-10 RX ORDER — AMITRIPTYLINE HYDROCHLORIDE 10 MG/1
10 TABLET, FILM COATED ORAL NIGHTLY
Qty: 30 TABLET | Refills: 2 | Status: SHIPPED | OUTPATIENT
Start: 2017-10-10 | End: 2017-12-30 | Stop reason: SDUPTHER

## 2017-10-10 RX ORDER — AMOXICILLIN AND CLAVULANATE POTASSIUM 875; 125 MG/1; MG/1
1 TABLET, FILM COATED ORAL 2 TIMES DAILY
Qty: 20 TABLET | Refills: 0 | Status: SHIPPED | OUTPATIENT
Start: 2017-10-10

## 2017-10-10 NOTE — PROGRESS NOTES
Chief Complaint   Patient presents with   • Knee Pain        Subjective     History of Present Illness     The pt reports he ran into an office in [place] to a doctor Dr Salmon and noted mildly elevated BG.The patient reports he is in the cervical area due to watching his granddaughter      He requested abx for gum problems.  He has TMJ and medicare is going to have implants put in.    Taking motrin 800mg tid on average and states clelebrex did not help.    Absolutely declines PMR    Went to ER gave norco --for pain and was told the ER doc would write me a letter regarding writing him some pain meds [facility]    Edema prn and at present    gerd ppi doing well    The following portions of the patient's history were reviewed and updated as appropriate: allergies, current medications, past family history, past medical history, past social history, past surgical history and problem list.    Review of Systems   All other systems reviewed and are negative.   headache dizziness visual changes no change in appetite activity no sweats no myalgias no abdominal pain nausea vomiting diarrhea no chest pain shortness of breath intermittent swelling.  Chronic low back pain.  Pain in his lower extremities.  Physical Exam   Constitutional: He is oriented to person, place, and time. He appears well-developed and well-nourished.   Eyes: Conjunctivae are normal. No scleral icterus.   Neck: Normal range of motion. Neck supple. No thyromegaly present.   Cardiovascular: Normal rate and regular rhythm.    Pulmonary/Chest: Effort normal and breath sounds normal.   Abdominal: Soft. Bowel sounds are normal.   Lymphadenopathy:     He has no cervical adenopathy.   Neurological: He is alert and oriented to person, place, and time.   Skin: Skin is warm and dry.   Psychiatric:   Agitated up and pacing in the room forced rapid speech   Nursing note and vitals reviewed.          Current Outpatient Prescriptions:   •  amitriptyline (ELAVIL) 100  MG tablet, Take 100 mg by mouth Daily., Disp: , Rfl: 1  •  amLODIPine (NORVASC) 5 MG tablet, Take 1 tablet by mouth Daily., Disp: 30 tablet, Rfl: 0  •  hydrochlorothiazide (HYDRODIURIL) 25 MG tablet, Take 1 tablet by mouth Daily., Disp: 30 tablet, Rfl: 2  •  hydrOXYzine (ATARAX) 50 MG tablet, Take 50 mg by mouth Daily., Disp: , Rfl: 0  •  ibuprofen (ADVIL,MOTRIN) 800 MG tablet, Take 1 tablet by mouth Every 8 (Eight) Hours As Needed for Mild Pain ., Disp: 90 tablet, Rfl: 0  •  omeprazole (priLOSEC) 20 MG capsule, Take 20 mg by mouth Daily., Disp: , Rfl: 1  •  ondansetron (ZOFRAN) 8 MG tablet, Take 1 tablet by mouth Every 12 (Twelve) Hours As Needed for Nausea or Vomiting., Disp: 30 tablet, Rfl: 2  •  pregabalin (LYRICA) 200 MG capsule, Take 1 capsule by mouth Daily., Disp: 7 capsule, Rfl: 0  •  tiZANidine (ZANAFLEX) 4 MG tablet, Take 4 mg by mouth Daily., Disp: , Rfl: 0  •  traMADol (ULTRAM) 50 MG tablet, Take 50 mg by mouth Daily., Disp: , Rfl: 0  •  amitriptyline (ELAVIL) 10 MG tablet, Take 1 tablet by mouth Every Night., Disp: 30 tablet, Rfl: 2  •  amoxicillin-clavulanate (AUGMENTIN) 875-125 MG per tablet, Take 1 tablet by mouth 2 (Two) Times a Day., Disp: 20 tablet, Rfl: 0  •  celecoxib (CeleBREX) 200 MG capsule, Take 200 mg by mouth Daily., Disp: , Rfl: 1      Assessment/Plan   Nathalia was seen today for knee pain.    Diagnoses and all orders for this visit:    Long-term use of high-risk medication  -     Cancel: Urine Drug Screen - Urine, Clean Catch; Future  -     XR knee 1 or 2 vw right; Future  -     CBC & Differential  -     Comprehensive Metabolic Panel  -     Lipid Panel  -     Cancel: POC Urinalysis Dipstick, Automated  -     CBC Auto Differential    Anxiety  -     CBC & Differential  -     Comprehensive Metabolic Panel  -     Lipid Panel  -     Cancel: POC Urinalysis Dipstick, Automated  -     CBC Auto Differential    Obesity, unspecified obesity severity, unspecified obesity type  -     CBC &  Differential  -     Comprehensive Metabolic Panel  -     Lipid Panel  -     Cancel: POC Urinalysis Dipstick, Automated  -     CBC Auto Differential    Elevated liver enzymes  -     CBC & Differential  -     Comprehensive Metabolic Panel  -     Lipid Panel  -     Cancel: POC Urinalysis Dipstick, Automated  -     CBC Auto Differential    Chronic low back pain, unspecified back pain laterality, with sciatica presence unspecified  -     CBC & Differential  -     Comprehensive Metabolic Panel  -     Lipid Panel  -     Cancel: POC Urinalysis Dipstick, Automated  -     CBC Auto Differential    Essential hypertension  -     CBC & Differential  -     Comprehensive Metabolic Panel  -     Lipid Panel  -     Cancel: POC Urinalysis Dipstick, Automated  -     CBC Auto Differential  -     POC Urinalysis Dipstick, Automated    Gastroesophageal reflux disease, esophagitis presence not specified  -     CBC & Differential  -     Comprehensive Metabolic Panel  -     Lipid Panel  -     Cancel: POC Urinalysis Dipstick, Automated  -     CBC Auto Differential    Other orders  -     amLODIPine (NORVASC) 5 MG tablet; Take 1 tablet by mouth Daily.  -     pregabalin (LYRICA) 200 MG capsule; Take 1 capsule by mouth Daily.  -     amitriptyline (ELAVIL) 10 MG tablet; Take 1 tablet by mouth Every Night.  -     amoxicillin-clavulanate (AUGMENTIN) 875-125 MG per tablet; Take 1 tablet by mouth 2 (Two) Times a Day.    Approximately 30 minute visit approximately 20 minutes in counseling in regards to what is listed below.  Dips and TA 1-2 cig a month per pt.  Reviewed the risk of tobacco use and oral tobacco.  Patient does not seem concerned at this time in regards to anything but obtaining his meds for pain.  He reports he is only concerned about his pain and he would like to travel to [place] to see if his pain medicine can be refilled there and he wouldn't have to worry about any drug screens were controlled substance agreements.    Last norco per  er 12 last dose was 9/6/17     Cautioned about No shows    Here today for request of Lyrica..  I reinforced his need for pain management he adamantly refuses.  The patient did not mention anything about having his medications filled in a different pharmacy location other than the one written on his controlled substance agreement.  The patient also did not mention that he had lost his medication that he got filled 6 days ago.  The patient did eventually leave urine drug screen.  Patient left prior to my call to the pharmacy where he typically gets his medications.  She reports she was in the pharmacy yesterday very upset because he wanted his Lyrica filled although he had just gotten his Lyrica filled in [place] October 6 and she was able to identify that information to him.  I did call the patient in regards to this information to let them know that even was stolen medications that we cannot refill his medicine and that he broke his controlled substance agreement when he used a different pharmacy location.  The patient did not mention any way that he had recently gotten this medication filled and it hadn't been stolen while he was here the office today.  I communicated to the patient that he would no longer be able to have controlled substances of any kind filled in this facility.  I will manage his medical needs however anything that has controlled substances he will need to be referred out.  I again offered him pain management as also like she does need pain management to follow his chronic back pain and knee pain however the patient declined.  The patient verbalized understanding.    I would like to see patient back in 3 months in regards to treatment of his chronic problems as of this visit has been in regards to a pt requesting additional pain medication.  I do feel pain management could be of help to this patient in regards to his pain.  Psychiatry would also be helpful in regards to possible counseling  his anxiety that is exacerbated by his pain.  The patient declines.      Return in about 3 months (around 1/10/2018) for 1 mo recheck , Recheck.  RTC/call  If symptoms worsen  Meds MOA and SE's reviewed and pt v/u

## 2017-10-10 NOTE — PATIENT INSTRUCTIONS
"DASH Eating Plan  DASH stands for \"Dietary Approaches to Stop Hypertension.\" The DASH eating plan is a healthy eating plan that has been shown to reduce high blood pressure (hypertension). Additional health benefits may include reducing the risk of type 2 diabetes mellitus, heart disease, and stroke. The DASH eating plan may also help with weight loss.  WHAT DO I NEED TO KNOW ABOUT THE DASH EATING PLAN?  For the DASH eating plan, you will follow these general guidelines:  · Choose foods with less than 150 milligrams of sodium per serving (as listed on the food label).  · Use salt-free seasonings or herbs instead of table salt or sea salt.  · Check with your health care provider or pharmacist before using salt substitutes.  · Eat lower-sodium products. These are often labeled as \"low-sodium\" or \"no salt added.\"  · Eat fresh foods. Avoid eating a lot of canned foods.  · Eat more vegetables, fruits, and low-fat dairy products.  · Choose whole grains. Look for the word \"whole\" as the first word in the ingredient list.  · Choose fish and skinless chicken or turkey more often than red meat. Limit fish, poultry, and meat to 6 oz (170 g) each day.  · Limit sweets, desserts, sugars, and sugary drinks.  · Choose heart-healthy fats.  · Eat more home-cooked food and less restaurant, buffet, and fast food.  · Limit fried foods.  · Do not briggs foods. Cook foods using methods such as baking, boiling, grilling, and broiling instead.  · When eating at a restaurant, ask that your food be prepared with less salt, or no salt if possible.  WHAT FOODS CAN I EAT?  Seek help from a dietitian for individual calorie needs.  Grains  Whole grain or whole wheat bread. Brown rice. Whole grain or whole wheat pasta. Quinoa, bulgur, and whole grain cereals. Low-sodium cereals. Corn or whole wheat flour tortillas. Whole grain cornbread. Whole grain crackers. Low-sodium crackers.  Vegetables  Fresh or frozen vegetables (raw, steamed, roasted, or " grilled). Low-sodium or reduced-sodium tomato and vegetable juices. Low-sodium or reduced-sodium tomato sauce and paste. Low-sodium or reduced-sodium canned vegetables.   Fruits  All fresh, canned (in natural juice), or frozen fruits.  Meat and Other Protein Products  Ground beef (85% or leaner), grass-fed beef, or beef trimmed of fat. Skinless chicken or turkey. Ground chicken or turkey. Pork trimmed of fat. All fish and seafood. Eggs. Dried beans, peas, or lentils. Unsalted nuts and seeds. Unsalted canned beans.  Dairy  Low-fat dairy products, such as skim or 1% milk, 2% or reduced-fat cheeses, low-fat ricotta or cottage cheese, or plain low-fat yogurt. Low-sodium or reduced-sodium cheeses.  Fats and Oils  Tub margarines without trans fats. Light or reduced-fat mayonnaise and salad dressings (reduced sodium). Avocado. Safflower, olive, or canola oils. Natural peanut or almond butter.  Other  Unsalted popcorn and pretzels.  The items listed above may not be a complete list of recommended foods or beverages. Contact your dietitian for more options.  WHAT FOODS ARE NOT RECOMMENDED?  Grains  White bread. White pasta. White rice. Refined cornbread. Bagels and croissants. Crackers that contain trans fat.  Vegetables  Creamed or fried vegetables. Vegetables in a cheese sauce. Regular canned vegetables. Regular canned tomato sauce and paste. Regular tomato and vegetable juices.  Fruits  Canned fruit in light or heavy syrup. Fruit juice.  Meat and Other Protein Products  Fatty cuts of meat. Ribs, chicken wings, perkins, sausage, bologna, salami, chitterlings, fatback, hot dogs, bratwurst, and packaged luncheon meats. Salted nuts and seeds. Canned beans with salt.  Dairy  Whole or 2% milk, cream, half-and-half, and cream cheese. Whole-fat or sweetened yogurt. Full-fat cheeses or blue cheese. Nondairy creamers and whipped toppings. Processed cheese, cheese spreads, or cheese curds.  Condiments  Onion and garlic salt, seasoned  salt, table salt, and sea salt. Canned and packaged gravies. Worcestershire sauce. Tartar sauce. Barbecue sauce. Teriyaki sauce. Soy sauce, including reduced sodium. Steak sauce. Fish sauce. Oyster sauce. Cocktail sauce. Horseradish. Ketchup and mustard. Meat flavorings and tenderizers. Bouillon cubes. Hot sauce. Tabasco sauce. Marinades. Taco seasonings. Relishes.  Fats and Oils  Butter, stick margarine, lard, shortening, ghee, and perkins fat. Coconut, palm kernel, or palm oils. Regular salad dressings.  Other  Pickles and olives. Salted popcorn and pretzels.  The items listed above may not be a complete list of foods and beverages to avoid. Contact your dietitian for more information.  WHERE CAN I FIND MORE INFORMATION?  National Heart, Lung, and Blood [place]: www.nhlbi.nih.gov/health/health-topics/topics/dash/     This information is not intended to replace advice given to you by your health care provider. Make sure you discuss any questions you have with your health care provider.     Document Released: 12/06/2012 Document Revised: 04/10/2017 Document Reviewed: 10/22/2014  Elsevier Interactive Patient Education ©2017 [employer]

## 2017-10-10 NOTE — TELEPHONE ENCOUNTER
Pt. informed. Verbal understanding and great appreciation received. States that he does not like Pain Clinics and will not go to one.    Pt in office today.

## 2017-10-17 ENCOUNTER — TELEPHONE (OUTPATIENT)
Dept: INTERNAL MEDICINE | Facility: CLINIC | Age: 62
End: 2017-10-17

## 2017-10-17 DIAGNOSIS — R25.1 OCCASIONAL TREMORS: Primary | ICD-10-CM

## 2017-10-17 NOTE — TELEPHONE ENCOUNTER
----- Message from Fabiana Warren sent at 10/16/2017 12:10 PM EDT -----  Contact: RAKESH JONES FROM [facility] CALLING IN REGARDS TO [facility], SAID SHE FAX A PHYSICIAN ORDER FOR A DME, SHE WOULD LIKE TO CHECK THE STATUS OF THIS. SHE CAN BE REACHED -[phone]

## 2017-10-19 NOTE — TELEPHONE ENCOUNTER
Tristen from [facility] called again requesting DME   She was informed that provider would be able to respond to this message until tomorrow when she returns. Call back number already given.

## 2017-10-20 NOTE — TELEPHONE ENCOUNTER
I do not see that he has diabetes.  Is DME meaning diabetic education? Please call this person Tristen to find out specifically what pt needs.

## 2017-10-23 NOTE — TELEPHONE ENCOUNTER
Spoke with Tristen. States the pt has specifically requested DME (Durable Medical Equipment) for back and bilateral knee supports. Medicare has approved but needs order to be signed. She is faxing order again today.

## 2017-10-24 NOTE — TELEPHONE ENCOUNTER
LMOV for Tristen at [facility]- need to know if face to face is needed before orders for back and knee support are signed.

## 2017-10-25 NOTE — TELEPHONE ENCOUNTER
Spoke with Tristen. States pt's Medicare is NGS and it is okay to sign orders now and if face to face is needed, that can be done at a later date. Pt is scheduled for 11/06/2017, Tristen stated that Face to Face can be done at that time if needed.

## 2017-10-26 ENCOUNTER — TELEPHONE (OUTPATIENT)
Dept: INTERNAL MEDICINE | Facility: CLINIC | Age: 62
End: 2017-10-26

## 2017-10-27 NOTE — TELEPHONE ENCOUNTER
----- Message from Carin Santo sent at 10/17/2017  1:56 PM EDT -----  10-17-17 @ 1:56 pm   Patient was schedule several times for this CT scan patient cx 3 times and was a no show 1 time .  Would you like to d/c order this order is back from May.   ----- Message -----     From: SYSTEM     Sent: 5/15/2017  12:08 AM       To: [facility]

## 2017-10-30 ENCOUNTER — TELEPHONE (OUTPATIENT)
Dept: INTERNAL MEDICINE | Facility: CLINIC | Age: 62
End: 2017-10-30

## 2017-10-30 NOTE — TELEPHONE ENCOUNTER
I will be more than glad to take care of patient and treat any of his medical needs.  I will no longer write his controlled substance due to breaking controlled substance agreement.

## 2017-10-30 NOTE — TELEPHONE ENCOUNTER
Spoke with pt.  States he only takes meds for his arthritis and he does not want to see a pain specialist.  recommended he see a rheumatologist.  Pt states he will consider but continue to see Manju for other medical issues.  No other questions/concners.

## 2017-10-30 NOTE — TELEPHONE ENCOUNTER
----- Message from Allyssa Sahni sent at 10/30/2017  1:05 PM EDT -----  [phone]    PT WISHES A CALL BACK TODAY.  HE RECEIVED A LETTER ABOUT OVER DUE CT ORDER BUT SAYS HE SPOKE WITH CANDACE AND WAS TOLD WE COULD NO LONGER SEE HIM DUE TO A CONTROLLED BEING FILLED AT DIFFERENT PHARMACIES.  PT WAS TOLD PER SRIDEVI THAT CANDACE TOLD HIM WE COULD STILL SEE HIM -BUT COULD NOT WRITE CONTROLLED SUBTANCE RXs ANYMORE.  PT IS STILL CONFUSED AND WISHES A CALL BACK

## 2017-11-02 DIAGNOSIS — M54.50 LOW BACK PAIN WITHOUT SCIATICA, UNSPECIFIED BACK PAIN LATERALITY, UNSPECIFIED CHRONICITY: Primary | ICD-10-CM

## 2017-11-02 RX ORDER — IBUPROFEN 800 MG/1
TABLET ORAL
Qty: 90 TABLET | Refills: 0 | OUTPATIENT
Start: 2017-11-02

## 2017-11-02 NOTE — TELEPHONE ENCOUNTER
----- Message from Allyssa Sahni sent at 11/2/2017  8:58 AM EDT -----  [phone]    WANTS RX FOR GABAPENTIN-HE HAD CRIPPLING ARTHRITIS AND NEEDS TO GET OUT AND RUN SOME ERRANDS AND PAY BILLS.  HE GOT UP AT 2 AND DOZED OFF AND GOT BACK UP    [facility]

## 2017-11-02 NOTE — TELEPHONE ENCOUNTER
As reviewed with patient patient cannot have controlled substances due to breaking contract in regards to controlled substance.  Positive drug screen with alcohol.  Patient takes Celebrex daily.  Motrin 3 times daily 800 mg dosing is duplicate therapy.  I again would ask him to go to pain management for his pain control.

## 2017-11-27 RX ORDER — AMLODIPINE BESYLATE 5 MG/1
TABLET ORAL
Qty: 30 TABLET | Refills: 0 | Status: SHIPPED | OUTPATIENT
Start: 2017-11-27 | End: 2017-12-30 | Stop reason: SDUPTHER

## 2017-12-01 ENCOUNTER — TELEPHONE (OUTPATIENT)
Dept: INTERNAL MEDICINE | Facility: CLINIC | Age: 62
End: 2017-12-01

## 2017-12-01 NOTE — TELEPHONE ENCOUNTER
----- Message from Xochitl Bruner MA sent at 12/1/2017  1:28 PM EST -----  Pt called and states he broke his foot.  Wants to know if Manju can call in ibuprofen 800 mg for him.

## 2017-12-04 ENCOUNTER — TELEPHONE (OUTPATIENT)
Dept: INTERNAL MEDICINE | Facility: CLINIC | Age: 62
End: 2017-12-04

## 2017-12-04 RX ORDER — IBUPROFEN 800 MG/1
800 TABLET ORAL EVERY 8 HOURS PRN
Qty: 90 TABLET | Refills: 0 | Status: SHIPPED | OUTPATIENT
Start: 2017-12-04 | End: 2017-12-30 | Stop reason: SDUPTHER

## 2017-12-04 NOTE — TELEPHONE ENCOUNTER
Need to know what pharmacy to send Rx to       LMOVM to return call to relay message from Dr Griffith

## 2017-12-04 NOTE — TELEPHONE ENCOUNTER
Ok to call in the Ibuprofen 800 mg, TID prn pain, # 90, no refill.  Should not take his Celebrex while taking the Ibuprofen.    Then forward message to Manju to address the pain management question.

## 2017-12-04 NOTE — TELEPHONE ENCOUNTER
----- Message from Erika Felton sent at 12/4/2017  4:03 PM EST -----  Patient keeps calling about pain management referral, he says he doesn't want one, but talks like he wants. He refuses to have injection and has continually called and explained why he does not want them, but he doesn't ever like what I have to see that he can do. Do you have any other ideas on how to help pt?     P. [phone]

## 2017-12-04 NOTE — TELEPHONE ENCOUNTER
Incoming call:  HE WOULD LIKE [facility] S[facility] [place]     Need to tell him not to take medication with Celebrex   Instructions to not take Celebrex with the Ibuprofen put on rx instructions

## 2017-12-05 NOTE — TELEPHONE ENCOUNTER
I do not have other suggestion.  I too have spoken with pt numerous occasion in long discussion and detail about options for pain management.  It is the option he has . I will manage his medical care except his pain management. He is aware.

## 2017-12-06 NOTE — TELEPHONE ENCOUNTER
Patient called back and gave me the name of a pain management office he would like to go to, [facility]. I faxed referral and records to this office

## 2017-12-16 ENCOUNTER — TELEPHONE (OUTPATIENT)
Dept: INTERNAL MEDICINE | Facility: CLINIC | Age: 62
End: 2017-12-16

## 2017-12-28 RX ORDER — AMLODIPINE BESYLATE 5 MG/1
TABLET ORAL
Qty: 30 TABLET | Refills: 0 | OUTPATIENT
Start: 2017-12-28

## 2017-12-28 RX ORDER — IBUPROFEN 800 MG/1
TABLET ORAL
Qty: 90 TABLET | Refills: 0 | OUTPATIENT
Start: 2017-12-28

## 2018-01-02 RX ORDER — AMITRIPTYLINE HYDROCHLORIDE 10 MG/1
TABLET, FILM COATED ORAL
Qty: 30 TABLET | Refills: 2 | Status: SHIPPED | OUTPATIENT
Start: 2018-01-02 | End: 2018-02-05 | Stop reason: SDUPTHER

## 2018-01-02 RX ORDER — AMLODIPINE BESYLATE 5 MG/1
TABLET ORAL
Qty: 30 TABLET | Refills: 0 | Status: SHIPPED | OUTPATIENT
Start: 2018-01-02 | End: 2018-02-02 | Stop reason: SDUPTHER

## 2018-01-02 RX ORDER — IBUPROFEN 800 MG/1
TABLET ORAL
Qty: 90 TABLET | Refills: 0 | Status: SHIPPED | OUTPATIENT
Start: 2018-01-02 | End: 2018-02-02 | Stop reason: SDUPTHER

## 2018-02-02 NOTE — TELEPHONE ENCOUNTER
Patient's pharmacy requesting refills for ibuprofen and amlodipine.    Please advise if patient is due for office visit for medication check.  -Pt last seen  10/10/2017.  -Last rx  for ibuprofen 1/2/2018, #90 with 0 refills  -last rx for amlodipine 1/2/2018, #30 with 0 refills

## 2018-02-05 ENCOUNTER — TELEPHONE (OUTPATIENT)
Dept: INTERNAL MEDICINE | Facility: CLINIC | Age: 63
End: 2018-02-05

## 2018-02-05 RX ORDER — AMITRIPTYLINE HYDROCHLORIDE 10 MG/1
TABLET, FILM COATED ORAL
Qty: 30 TABLET | Refills: 2 | Status: SHIPPED | OUTPATIENT
Start: 2018-02-05 | End: 2018-04-02 | Stop reason: SDUPTHER

## 2018-02-05 NOTE — TELEPHONE ENCOUNTER
----- Message from Zoila Waddell V sent at 2/5/2018 12:28 PM EST -----  Pt call and stated he need a refill on amitriptyline (ELAVIL) 10 MG tablet and he stated he needs it at 100 MG, ibuprofen (ADVIL,MOTRIN) 800 MG tablet, and hydrOXYzine (ATARAX) 50 MG tablet    plz call pt at [phone]    [facility] - [phone]  - [phone] FX

## 2018-02-05 NOTE — TELEPHONE ENCOUNTER
Please advise.   ibuprofen  -Pt last seen  10/10/2017  -Last rx  1/2/2018, #90 with 0 refills    Atarax is listed as a historical provider, please clarify amount and refills if you wish for this to be sent in to pharmacy.    amitriptyline sent in to pharmacy.

## 2018-02-06 RX ORDER — AMLODIPINE BESYLATE 5 MG/1
TABLET ORAL
Qty: 30 TABLET | Refills: 0 | Status: SHIPPED | OUTPATIENT
Start: 2018-02-06 | End: 2018-04-02 | Stop reason: SDUPTHER

## 2018-02-06 RX ORDER — IBUPROFEN 800 MG/1
TABLET ORAL
Qty: 90 TABLET | Refills: 0 | OUTPATIENT
Start: 2018-02-06

## 2018-02-06 RX ORDER — IBUPROFEN 800 MG/1
TABLET ORAL
Qty: 90 TABLET | Refills: 0 | Status: SHIPPED | OUTPATIENT
Start: 2018-02-06 | End: 2018-04-02 | Stop reason: SDUPTHER

## 2018-02-06 NOTE — TELEPHONE ENCOUNTER
Per Fabiana Warren in front office:    Incoming call:  PT CALLING BACK TO CHECK ON REQUEST, NOTIFIED HIM HE NEEDS TO BE SEEN FIRST. MADE APPT FOR NEXT WEEK.

## 2018-02-26 ENCOUNTER — TELEPHONE (OUTPATIENT)
Dept: INTERNAL MEDICINE | Facility: CLINIC | Age: 63
End: 2018-02-26

## 2018-03-12 ENCOUNTER — TELEPHONE (OUTPATIENT)
Dept: INTERNAL MEDICINE | Facility: CLINIC | Age: 63
End: 2018-03-12

## 2018-03-12 NOTE — TELEPHONE ENCOUNTER
----- Message from Allyssa Sahni sent at 3/12/2018 11:07 AM EDT -----  DARVIN-CALLED PT ON 3/12/18 TO CONFIRM APPT FOR 3/13/18 WITH CANDACE    HE SAID SINCE WE WOULD NOT SEE HIM ON 3/2/18 WHEN HE WENT TO THE WRONG PRACTICE HE WAS GOING TO A DR CLOSER TO WHERE HE LIVES    PCP WAS UPDATED

## 2018-03-17 ENCOUNTER — TELEPHONE (OUTPATIENT)
Dept: INTERNAL MEDICINE | Facility: CLINIC | Age: 63
End: 2018-03-17

## 2018-03-30 RX ORDER — AMLODIPINE BESYLATE 5 MG/1
TABLET ORAL
Qty: 30 TABLET | Refills: 0 | Status: CANCELLED | OUTPATIENT
Start: 2018-03-30

## 2018-04-02 ENCOUNTER — TELEPHONE (OUTPATIENT)
Dept: INTERNAL MEDICINE | Facility: CLINIC | Age: 63
End: 2018-04-02

## 2018-04-02 RX ORDER — HYDROXYZINE 50 MG/1
50 TABLET, FILM COATED ORAL DAILY
Qty: 30 TABLET | Refills: 0 | Status: SHIPPED | OUTPATIENT
Start: 2018-04-02

## 2018-04-02 RX ORDER — IBUPROFEN 800 MG/1
800 TABLET ORAL EVERY 8 HOURS PRN
Qty: 90 TABLET | Refills: 0 | Status: SHIPPED | OUTPATIENT
Start: 2018-04-02

## 2018-04-02 RX ORDER — HYDROCHLOROTHIAZIDE 25 MG/1
25 TABLET ORAL DAILY
Qty: 30 TABLET | Refills: 0 | Status: SHIPPED | OUTPATIENT
Start: 2018-04-02

## 2018-04-02 RX ORDER — AMITRIPTYLINE HYDROCHLORIDE 10 MG/1
TABLET, FILM COATED ORAL
Qty: 30 TABLET | Refills: 0 | Status: SHIPPED | OUTPATIENT
Start: 2018-04-02

## 2018-04-02 RX ORDER — AMLODIPINE BESYLATE 5 MG/1
5 TABLET ORAL DAILY
Qty: 30 TABLET | Refills: 0 | Status: SHIPPED | OUTPATIENT
Start: 2018-04-02

## 2018-04-02 NOTE — TELEPHONE ENCOUNTER
Please call pt and let him know I refilled amlodipine, HCTZ, hydroxyzine, ibuprofen, and amitriptyline for 1 month only. Sent to [facility] [address].

## 2018-04-02 NOTE — TELEPHONE ENCOUNTER
----- Message from Allyssa Sahni sent at 4/2/2018 11:45 AM EDT -----  [phone]    PT GOT DISMISSAL LETTER-BUT NEEDS REFILLS OF MEDS   amLODIPine (NORVASC) 5 MG tablet  hydrochlorothiazide (HYDRODIURIL) 25 MG tablet  hydrOXYzine (ATARAX) 50 MG tablet  ibuprofen (ADVIL,MOTRIN) 800 MG tablet    HE SAYS THERE ARE ALSO OTHERS THAT HELP HIM SLEEP THAT HE NEEDS AND HE DOESN'T KNOW THE NAMES    JARED NINA

## 2018-04-06 RX ORDER — OMEPRAZOLE 20 MG/1
20 CAPSULE, DELAYED RELEASE ORAL DAILY
Qty: 30 CAPSULE | Refills: 0 | Status: SHIPPED | OUTPATIENT
Start: 2018-04-06

## 2018-04-06 NOTE — TELEPHONE ENCOUNTER
Patient called requesting omeprazole be called in for his stomach.     Pharmacy[facility]     Call back for patient: [phone]    Thank you.

## 2020-11-20 PROCEDURE — U0004 COV-19 TEST NON-CDC HGH THRU: HCPCS | Performed by: NURSE PRACTITIONER

## 2021-08-17 NOTE — TELEPHONE ENCOUNTER
Impression: Combined forms of age-related cataract, bilateral: H25.813. Plan: Plan to perform cataract surgery in both eyes with the Right eye first: Discussed the risks, benefits, and alternatives of cataract surgery. Given that we almost never use retrobulbar anesthesia, there is typically no need to stop any anticoagulant medications when a patient gets cataract surgery with us. In most cataract surgeries performed by us we place an antibiotic and steroid at the time of surgery so most likely will not need to use any prescription post-op drops. The patient stated a full understanding and a desire to proceed with the procedure. Biometry ordered for intraocular lens selection. Advised against driving until complete. Reviewed the increased risk of retinal detachment after cataract surgery and reviewed retinal detachment and general warnings and to contact us immediately should any of those develop. Manju      Do you want him to take this .  He is also on celebrex ?

## 2023-06-21 PROBLEM — F11.90 OPIOID USE DISORDER: Status: ACTIVE | Noted: 2023-06-21

## 2023-06-21 PROBLEM — M86.9 DIABETIC FOOT ULCER WITH OSTEOMYELITIS: Status: ACTIVE | Noted: 2023-06-21

## 2023-06-21 PROBLEM — E11.43 TYPE 2 DIABETES MELLITUS WITH DIABETIC AUTONOMIC NEUROPATHY, WITHOUT LONG-TERM CURRENT USE OF INSULIN: Status: ACTIVE | Noted: 2023-06-21

## 2023-06-21 PROBLEM — M86.9 OSTEOMYELITIS OF GREAT TOE OF LEFT FOOT: Status: ACTIVE | Noted: 2023-06-21

## 2023-06-21 PROBLEM — E78.5 HLD (HYPERLIPIDEMIA): Status: ACTIVE | Noted: 2023-06-21

## 2023-06-21 PROBLEM — E11.621 DIABETIC FOOT ULCER WITH OSTEOMYELITIS: Status: ACTIVE | Noted: 2023-06-21

## 2023-06-21 PROBLEM — F31.9 BIPOLAR AFFECTIVE DISORDER: Status: ACTIVE | Noted: 2023-06-21

## 2023-06-21 PROBLEM — E11.9 DM2 (DIABETES MELLITUS, TYPE 2): Status: ACTIVE | Noted: 2023-06-21

## 2023-06-21 PROBLEM — I10 HTN (HYPERTENSION): Status: ACTIVE | Noted: 2023-06-21

## 2023-06-21 PROBLEM — L97.509 DIABETIC FOOT ULCER WITH OSTEOMYELITIS: Status: ACTIVE | Noted: 2023-06-21

## 2023-06-21 PROBLEM — E11.69 DIABETIC FOOT ULCER WITH OSTEOMYELITIS: Status: ACTIVE | Noted: 2023-06-21

## 2023-06-25 PROBLEM — M54.41 CHRONIC BILATERAL LOW BACK PAIN WITH RIGHT-SIDED SCIATICA: Status: ACTIVE | Noted: 2023-06-25

## 2023-06-25 PROBLEM — G89.29 CHRONIC BILATERAL LOW BACK PAIN WITH RIGHT-SIDED SCIATICA: Status: ACTIVE | Noted: 2023-06-25

## 2024-02-25 ENCOUNTER — HOSPITAL ENCOUNTER (EMERGENCY)
Facility: HOSPITAL | Age: 69
Discharge: HOME | End: 2024-02-25
Attending: EMERGENCY MEDICINE | Admitting: EMERGENCY MEDICINE
Payer: MEDICARE

## 2024-02-25 ENCOUNTER — APPOINTMENT (OUTPATIENT)
Dept: GENERAL RADIOLOGY | Facility: HOSPITAL | Age: 69
End: 2024-02-25
Payer: MEDICARE

## 2024-02-25 VITALS
BODY MASS INDEX: 29.52 KG/M2 | SYSTOLIC BLOOD PRESSURE: 158 MMHG | WEIGHT: 230 LBS | DIASTOLIC BLOOD PRESSURE: 70 MMHG | RESPIRATION RATE: 18 BRPM | OXYGEN SATURATION: 99 % | HEART RATE: 78 BPM | HEIGHT: 74 IN | TEMPERATURE: 98.4 F

## 2024-02-25 DIAGNOSIS — M79.672 CHRONIC FOOT PAIN, LEFT: Primary | ICD-10-CM

## 2024-02-25 DIAGNOSIS — G89.29 CHRONIC FOOT PAIN, LEFT: Primary | ICD-10-CM

## 2024-02-25 DIAGNOSIS — L97.321 SKIN ULCER OF LEFT ANKLE, LIMITED TO BREAKDOWN OF SKIN: ICD-10-CM

## 2024-02-25 PROCEDURE — 99283 EMERGENCY DEPT VISIT LOW MDM: CPT

## 2024-02-25 PROCEDURE — 96372 THER/PROPH/DIAG INJ SC/IM: CPT

## 2024-02-25 PROCEDURE — 25010000002 KETOROLAC TROMETHAMINE PER 15 MG: Performed by: EMERGENCY MEDICINE

## 2024-02-25 PROCEDURE — 73630 X-RAY EXAM OF FOOT: CPT

## 2024-02-25 RX ORDER — CEPHALEXIN 500 MG/1
500 CAPSULE ORAL 3 TIMES DAILY
Qty: 21 CAPSULE | Refills: 0 | Status: SHIPPED | OUTPATIENT
Start: 2024-02-25 | End: 2024-03-03

## 2024-02-25 RX ORDER — KETOROLAC TROMETHAMINE 15 MG/ML
15 INJECTION, SOLUTION INTRAMUSCULAR; INTRAVENOUS ONCE
Status: COMPLETED | OUTPATIENT
Start: 2024-02-25 | End: 2024-02-25

## 2024-02-25 RX ADMIN — KETOROLAC TROMETHAMINE 15 MG: 15 INJECTION, SOLUTION INTRAMUSCULAR; INTRAVENOUS at 21:33

## 2024-02-26 NOTE — ED PROVIDER NOTES
"Subjective   History of Present Illness  68-year-old male presents for evaluation of chronic left foot pain.  Of note, the patient tells me that he has been experiencing chronic pain to his left foot now following an injury in December 2023 for the past 3 months.  He is currently homeless.  He was concerned that his foot could be \"getting infected\" so he called EMS and was brought to our facility to be evaluated tonight.  He denies any repeat injury over the past few weeks.  He denies any drainage from the area.  He tells me that the pain is currently 9 out of 10 in severity.      Review of Systems   Constitutional:  Negative for fever.   Musculoskeletal:         Left foot pain   Skin:  Positive for wound.   Neurological:  Negative for numbness.       Past Medical History:   Diagnosis Date    Anxiety     Depression     Diabetes mellitus     Fibromyalgia, primary     Hypertension     Rheumatoid arthritis        No Known Allergies    Past Surgical History:   Procedure Laterality Date    AMPUTATION DIGIT Left 6/26/2023    Procedure: LEFT 1ST TOE AMPUTATION;  Surgeon: Yomaira Espinoza MD;  Location: [facility];  Service: Vascular;  Laterality: Left;    CYST REMOVAL Right 1999    right knee       Family History   Problem Relation Age of Onset    Stomach cancer Mother        Social History     Socioeconomic History    Marital status:    Tobacco Use    Smoking status: Every Day     Packs/day: 1.00     Years: 10.00     Additional pack years: 0.00     Total pack years: 10.00     Types: Cigarettes    Smokeless tobacco: Current     Types: Snuff   Vaping Use    Vaping Use: Never used   Substance and Sexual Activity    Alcohol use: No    Drug use: No    Sexual activity: Defer           Objective   Physical Exam  Vitals and nursing note reviewed.   Constitutional:       General: He is not in acute distress.     Appearance: Normal appearance. He is well-developed. He is not diaphoretic.      Comments: " Nontoxic-appearing male, disheveled   HENT:      Head: Normocephalic and atraumatic.   Neck:      Vascular: No JVD.   Cardiovascular:      Rate and Rhythm: Normal rate and regular rhythm.      Heart sounds: Normal heart sounds. No murmur heard.     No friction rub. No gallop.   Pulmonary:      Effort: Pulmonary effort is normal. No respiratory distress.      Breath sounds: Normal breath sounds. No wheezing or rales.   Musculoskeletal:      Comments: Prior amputations noted to distal aspect of left foot   Skin:     Comments: Tiny ulceration noted to anterior aspect of left ankle, no drainage, no surrounding warmth erythema present, no purulence    No palpable crepitus   Neurological:      Mental Status: He is alert and oriented to person, place, and time.      Comments: Left lower extremity is neurovascularly intact distally with bounding distal pulses normal sensation noted   Psychiatric:         Mood and Affect: Mood normal.         Thought Content: Thought content normal.         Judgment: Judgment normal.         Procedures           ED Course  ED Course as of 02/25/24 2306   Sun Feb 25, 2024 2110 68-year-old male presents for evaluation of chronic left foot pain.  Of note, the patient tells me that he has been experiencing chronic pain to his left foot now following an injury in December 2023.  He is currently homeless.  As a result of his ongoing pain he called EMS and was brought here to our facility for evaluation this evening.  On arrival, the patient is nontoxic-appearing.  He has multiple amputated toes on his left foot.  There is a tiny ulceration noted to the anterior aspect of his left ankle without any surrounding warmth erythema noted.  No purulent drainage present.  No palpable crepitus.  Left lower extremity is neurovascularly intact distally with bounding pulses distally and normal sensation noted. [DD]   2111 I personally and independently viewed the patient's x-ray images myself, and I am in  "agreement with the radiologist's reading for final interpretation--particularly, there is no gas in the soft tissue or underlying acute fracture present. [DD]   2111 Patient reassured and counseled regarding symptomatic management.  Wound care provided.  Pain control provided.  We will treat conservatively with a prescription for Keflex to cover for potential early cellulitis.  I have referred the patient to Dr. Aguilera of orthopedics.  He will follow-up within the next week.  Agreeable with plan and given appropriate strict return precautions. [DD]      ED Course User Index  [DD] Law Mcgowan MD                                   No results found for this or any previous visit (from the past 24 hour(s)).  Note: In addition to lab results from this visit, the labs listed above may include labs taken at another facility or during a different encounter within the last 24 hours. Please correlate lab times with ED admission and discharge times for further clarification of the services performed during this visit.    XR Foot 3+ View Left   Final Result   Impression:   Postoperative changes from amputation of the first and second digits. Healing fractures of the distal second and third metatarsals.            Electronically Signed: Brian Ferreira MD     2/25/2024 9:49 PM EST     Workstation ID: [id number]        Vitals:    02/25/24 2131   BP: 158/70   BP Location: Right arm   Patient Position: Sitting   Pulse: 78   Resp: 18   Temp: 98.4 °F (36.9 °C)   TempSrc: Oral   SpO2: 99%   Weight: 104 kg (230 lb)   Height: 188 cm (74\")     Medications   ketorolac (TORADOL) injection 15 mg (15 mg Intramuscular Given 2/25/24 2133)     ECG/EMG Results (last 24 hours)       ** No results found for the last 24 hours. **          No orders to display                 Medical Decision Making  Problems Addressed:  Chronic foot pain, left: complicated acute illness or injury  Skin ulcer of left ankle, limited to breakdown of skin: " complicated acute illness or injury    Amount and/or Complexity of Data Reviewed  Radiology: ordered.    Risk  Prescription drug management.        Final diagnoses:   Chronic foot pain, left   Skin ulcer of left ankle, limited to breakdown of skin       ED Disposition  ED Disposition       ED Disposition   Discharge    Condition   Stable    Comment   --               Brian Wood MD  [address] [facility] [address] [phone]    In 1 week           Medication List        New Prescriptions      cephalexin 500 MG capsule  Commonly known as: KEFLEX  Take 1 capsule by mouth 3 (Three) Times a Day for 7 days.               Where to Get Your Medications        These medications were sent to [facility] #[id number] - [address] AT [facility] [address]  - [phone]  - [phone] [address] [address]      Phone: [phone]   cephalexin 500 MG capsule            Law Mcgowan MD  02/25/24 4596

## 2024-03-07 ENCOUNTER — HOSPITAL ENCOUNTER (OUTPATIENT)
Facility: HOSPITAL | Age: 69
Setting detail: OBSERVATION
Discharge: HOME | End: 2024-03-11
Attending: EMERGENCY MEDICINE | Admitting: INTERNAL MEDICINE
Payer: MEDICARE

## 2024-03-07 DIAGNOSIS — L03.115 CELLULITIS OF RIGHT LOWER EXTREMITY: ICD-10-CM

## 2024-03-07 DIAGNOSIS — E11.621 DIABETIC FOOT ULCER WITH OSTEOMYELITIS: ICD-10-CM

## 2024-03-07 DIAGNOSIS — G89.29 CHRONIC BILATERAL LOW BACK PAIN WITH RIGHT-SIDED SCIATICA: ICD-10-CM

## 2024-03-07 DIAGNOSIS — M79.604 PAIN AND SWELLING OF RIGHT LOWER EXTREMITY: ICD-10-CM

## 2024-03-07 DIAGNOSIS — M86.9 OSTEOMYELITIS OF GREAT TOE OF LEFT FOOT: ICD-10-CM

## 2024-03-07 DIAGNOSIS — A41.9 ACUTE SEPSIS: ICD-10-CM

## 2024-03-07 DIAGNOSIS — L97.509 DIABETIC FOOT ULCER WITH OSTEOMYELITIS: ICD-10-CM

## 2024-03-07 DIAGNOSIS — E11.43 TYPE 2 DIABETES MELLITUS WITH DIABETIC AUTONOMIC NEUROPATHY, WITHOUT LONG-TERM CURRENT USE OF INSULIN: ICD-10-CM

## 2024-03-07 DIAGNOSIS — M79.89 PAIN AND SWELLING OF RIGHT LOWER EXTREMITY: ICD-10-CM

## 2024-03-07 DIAGNOSIS — L03.115 CELLULITIS OF RIGHT LEG: Primary | ICD-10-CM

## 2024-03-07 DIAGNOSIS — M86.9 DIABETIC FOOT ULCER WITH OSTEOMYELITIS: ICD-10-CM

## 2024-03-07 DIAGNOSIS — M54.41 CHRONIC BILATERAL LOW BACK PAIN WITH RIGHT-SIDED SCIATICA: ICD-10-CM

## 2024-03-07 DIAGNOSIS — E11.69 DIABETIC FOOT ULCER WITH OSTEOMYELITIS: ICD-10-CM

## 2024-03-07 DIAGNOSIS — Z86.39 HISTORY OF DIABETES MELLITUS: ICD-10-CM

## 2024-03-07 LAB
ALBUMIN SERPL-MCNC: 4.1 G/DL (ref 3.5–5.2)
ALBUMIN/GLOB SERPL: 1.3 G/DL
ALP SERPL-CCNC: 115 U/L (ref 39–117)
ALT SERPL W P-5'-P-CCNC: 18 U/L (ref 1–41)
ANION GAP SERPL CALCULATED.3IONS-SCNC: 8 MMOL/L (ref 5–15)
AST SERPL-CCNC: 18 U/L (ref 1–40)
BASOPHILS # BLD AUTO: 0.02 10*3/MM3 (ref 0–0.2)
BASOPHILS NFR BLD AUTO: 0.2 % (ref 0–1.5)
BILIRUB SERPL-MCNC: <0.2 MG/DL (ref 0–1.2)
BILIRUB UR QL STRIP: NEGATIVE
BUN SERPL-MCNC: 17 MG/DL (ref 8–23)
BUN/CREAT SERPL: 19.5 (ref 7–25)
CALCIUM SPEC-SCNC: 9 MG/DL (ref 8.6–10.5)
CHLORIDE SERPL-SCNC: 97 MMOL/L (ref 98–107)
CLARITY UR: CLEAR
CO2 SERPL-SCNC: 27 MMOL/L (ref 22–29)
COLOR UR: YELLOW
CREAT SERPL-MCNC: 0.87 MG/DL (ref 0.76–1.27)
D-LACTATE SERPL-SCNC: 0.8 MMOL/L (ref 0.5–2)
DEPRECATED RDW RBC AUTO: 47.8 FL (ref 37–54)
EGFRCR SERPLBLD CKD-EPI 2021: 94.6 ML/MIN/1.73
EOSINOPHIL # BLD AUTO: 0.45 10*3/MM3 (ref 0–0.4)
EOSINOPHIL NFR BLD AUTO: 4.2 % (ref 0.3–6.2)
ERYTHROCYTE [DISTWIDTH] IN BLOOD BY AUTOMATED COUNT: 14 % (ref 12.3–15.4)
FLUAV RNA RESP QL NAA+PROBE: NOT DETECTED
FLUBV RNA RESP QL NAA+PROBE: NOT DETECTED
GLOBULIN UR ELPH-MCNC: 3.2 GM/DL
GLUCOSE SERPL-MCNC: 113 MG/DL (ref 65–99)
GLUCOSE UR STRIP-MCNC: NEGATIVE MG/DL
HCT VFR BLD AUTO: 32.8 % (ref 37.5–51)
HGB BLD-MCNC: 10.3 G/DL (ref 13–17.7)
HGB UR QL STRIP.AUTO: NEGATIVE
IMM GRANULOCYTES # BLD AUTO: 0.06 10*3/MM3 (ref 0–0.05)
IMM GRANULOCYTES NFR BLD AUTO: 0.6 % (ref 0–0.5)
KETONES UR QL STRIP: NEGATIVE
LEUKOCYTE ESTERASE UR QL STRIP.AUTO: NEGATIVE
LYMPHOCYTES # BLD AUTO: 2.85 10*3/MM3 (ref 0.7–3.1)
LYMPHOCYTES NFR BLD AUTO: 26.9 % (ref 19.6–45.3)
MCH RBC QN AUTO: 29 PG (ref 26.6–33)
MCHC RBC AUTO-ENTMCNC: 31.4 G/DL (ref 31.5–35.7)
MCV RBC AUTO: 92.4 FL (ref 79–97)
MONOCYTES # BLD AUTO: 1.4 10*3/MM3 (ref 0.1–0.9)
MONOCYTES NFR BLD AUTO: 13.2 % (ref 5–12)
NEUTROPHILS NFR BLD AUTO: 5.82 10*3/MM3 (ref 1.7–7)
NEUTROPHILS NFR BLD AUTO: 54.9 % (ref 42.7–76)
NITRITE UR QL STRIP: NEGATIVE
NRBC BLD AUTO-RTO: 0 /100 WBC (ref 0–0.2)
NT-PROBNP SERPL-MCNC: 52.5 PG/ML (ref 0–900)
PH UR STRIP.AUTO: 7.5 [PH] (ref 5–8)
PLATELET # BLD AUTO: 482 10*3/MM3 (ref 140–450)
PMV BLD AUTO: 10.1 FL (ref 6–12)
POTASSIUM SERPL-SCNC: 4.4 MMOL/L (ref 3.5–5.2)
PROCALCITONIN SERPL-MCNC: 0.08 NG/ML (ref 0–0.25)
PROT SERPL-MCNC: 7.3 G/DL (ref 6–8.5)
PROT UR QL STRIP: NEGATIVE
RBC # BLD AUTO: 3.55 10*6/MM3 (ref 4.14–5.8)
SARS-COV-2 RNA RESP QL NAA+PROBE: NOT DETECTED
SODIUM SERPL-SCNC: 132 MMOL/L (ref 136–145)
SP GR UR STRIP: 1.01 (ref 1–1.03)
TROPONIN T SERPL HS-MCNC: 27 NG/L
UROBILINOGEN UR QL STRIP: NORMAL
WBC NRBC COR # BLD AUTO: 10.6 10*3/MM3 (ref 3.4–10.8)

## 2024-03-07 PROCEDURE — 83880 ASSAY OF NATRIURETIC PEPTIDE: CPT | Performed by: EMERGENCY MEDICINE

## 2024-03-07 PROCEDURE — 87040 BLOOD CULTURE FOR BACTERIA: CPT | Performed by: EMERGENCY MEDICINE

## 2024-03-07 PROCEDURE — 93005 ELECTROCARDIOGRAM TRACING: CPT | Performed by: EMERGENCY MEDICINE

## 2024-03-07 PROCEDURE — 87636 SARSCOV2 & INF A&B AMP PRB: CPT | Performed by: EMERGENCY MEDICINE

## 2024-03-07 PROCEDURE — 80053 COMPREHEN METABOLIC PANEL: CPT | Performed by: EMERGENCY MEDICINE

## 2024-03-07 PROCEDURE — 86140 C-REACTIVE PROTEIN: CPT | Performed by: INTERNAL MEDICINE

## 2024-03-07 PROCEDURE — 85652 RBC SED RATE AUTOMATED: CPT | Performed by: INTERNAL MEDICINE

## 2024-03-07 PROCEDURE — 84484 ASSAY OF TROPONIN QUANT: CPT | Performed by: EMERGENCY MEDICINE

## 2024-03-07 PROCEDURE — 85025 COMPLETE CBC W/AUTO DIFF WBC: CPT | Performed by: EMERGENCY MEDICINE

## 2024-03-07 PROCEDURE — G0378 HOSPITAL OBSERVATION PER HR: HCPCS

## 2024-03-07 PROCEDURE — 83605 ASSAY OF LACTIC ACID: CPT | Performed by: EMERGENCY MEDICINE

## 2024-03-07 PROCEDURE — 81003 URINALYSIS AUTO W/O SCOPE: CPT | Performed by: EMERGENCY MEDICINE

## 2024-03-07 PROCEDURE — 99285 EMERGENCY DEPT VISIT HI MDM: CPT

## 2024-03-07 PROCEDURE — 80307 DRUG TEST PRSMV CHEM ANLYZR: CPT | Performed by: INTERNAL MEDICINE

## 2024-03-07 PROCEDURE — 84145 PROCALCITONIN (PCT): CPT | Performed by: EMERGENCY MEDICINE

## 2024-03-07 NOTE — Clinical Note
Level of Care: Telemetry [5]   Diagnosis: Fever [906050]   Admitting Physician: CARISA MORENO [[id number]]   Attending Physician: CARISA MORENO [[id number]]   Bed Request Comments: tele

## 2024-03-08 ENCOUNTER — APPOINTMENT (OUTPATIENT)
Dept: CT IMAGING | Facility: HOSPITAL | Age: 69
End: 2024-03-08
Payer: MEDICARE

## 2024-03-08 ENCOUNTER — APPOINTMENT (OUTPATIENT)
Dept: MRI IMAGING | Facility: HOSPITAL | Age: 69
End: 2024-03-08
Payer: MEDICARE

## 2024-03-08 ENCOUNTER — APPOINTMENT (OUTPATIENT)
Dept: GENERAL RADIOLOGY | Facility: HOSPITAL | Age: 69
End: 2024-03-08
Payer: MEDICARE

## 2024-03-08 ENCOUNTER — APPOINTMENT (OUTPATIENT)
Dept: CARDIOLOGY | Facility: HOSPITAL | Age: 69
End: 2024-03-08
Payer: MEDICARE

## 2024-03-08 PROBLEM — D75.839 THROMBOCYTOSIS: Status: ACTIVE | Noted: 2024-03-08

## 2024-03-08 PROBLEM — M79.89 PAIN AND SWELLING OF RIGHT LOWER EXTREMITY: Status: ACTIVE | Noted: 2024-03-08

## 2024-03-08 PROBLEM — L03.115 CELLULITIS OF RIGHT LOWER EXTREMITY: Status: ACTIVE | Noted: 2024-03-08

## 2024-03-08 PROBLEM — M79.604 PAIN AND SWELLING OF RIGHT LOWER EXTREMITY: Status: ACTIVE | Noted: 2024-03-08

## 2024-03-08 PROBLEM — R50.9 FEVER: Status: ACTIVE | Noted: 2024-03-08

## 2024-03-08 LAB
AMPHET+METHAMPHET UR QL: NEGATIVE
AMPHETAMINES UR QL: NEGATIVE
ANION GAP SERPL CALCULATED.3IONS-SCNC: 10 MMOL/L (ref 5–15)
ARTERIAL PATENCY WRIST A: ABNORMAL
ATMOSPHERIC PRESS: ABNORMAL MM[HG]
BARBITURATES UR QL SCN: NEGATIVE
BASE EXCESS BLDA CALC-SCNC: 2.2 MMOL/L (ref 0–2)
BASOPHILS # BLD AUTO: 0.03 10*3/MM3 (ref 0–0.2)
BASOPHILS NFR BLD AUTO: 0.3 % (ref 0–1.5)
BDY SITE: ABNORMAL
BENZODIAZ UR QL SCN: NEGATIVE
BH CV LOWER VASCULAR LEFT COMMON FEMORAL AUGMENT: NORMAL
BH CV LOWER VASCULAR LEFT COMMON FEMORAL COMPRESS: NORMAL
BH CV LOWER VASCULAR LEFT COMMON FEMORAL PHASIC: NORMAL
BH CV LOWER VASCULAR LEFT COMMON FEMORAL SPONT: NORMAL
BH CV LOWER VASCULAR RIGHT COMMON FEMORAL AUGMENT: NORMAL
BH CV LOWER VASCULAR RIGHT COMMON FEMORAL COMPRESS: NORMAL
BH CV LOWER VASCULAR RIGHT COMMON FEMORAL PHASIC: NORMAL
BH CV LOWER VASCULAR RIGHT COMMON FEMORAL SPONT: NORMAL
BH CV LOWER VASCULAR RIGHT DISTAL FEMORAL COMPRESS: NORMAL
BH CV LOWER VASCULAR RIGHT GASTRONEMIUS COMPRESS: NORMAL
BH CV LOWER VASCULAR RIGHT GREATER SAPH AK COMPRESS: NORMAL
BH CV LOWER VASCULAR RIGHT GREATER SAPH BK COMPRESS: NORMAL
BH CV LOWER VASCULAR RIGHT LESSER SAPH COMPRESS: NORMAL
BH CV LOWER VASCULAR RIGHT MID FEMORAL AUGMENT: NORMAL
BH CV LOWER VASCULAR RIGHT MID FEMORAL COMPRESS: NORMAL
BH CV LOWER VASCULAR RIGHT MID FEMORAL PHASIC: NORMAL
BH CV LOWER VASCULAR RIGHT MID FEMORAL SPONT: NORMAL
BH CV LOWER VASCULAR RIGHT PERONEAL COMPRESS: NORMAL
BH CV LOWER VASCULAR RIGHT POPLITEAL AUGMENT: NORMAL
BH CV LOWER VASCULAR RIGHT POPLITEAL COMPRESS: NORMAL
BH CV LOWER VASCULAR RIGHT POPLITEAL PHASIC: NORMAL
BH CV LOWER VASCULAR RIGHT POPLITEAL SPONT: NORMAL
BH CV LOWER VASCULAR RIGHT POSTERIOR TIBIAL COMPRESS: NORMAL
BH CV LOWER VASCULAR RIGHT PROFUNDA FEMORAL COMPRESS: NORMAL
BH CV LOWER VASCULAR RIGHT PROXIMAL FEMORAL COMPRESS: NORMAL
BH CV LOWER VASCULAR RIGHT SAPHENOFEMORAL JUNCTION COMPRESS: NORMAL
BODY TEMPERATURE: 37
BUN SERPL-MCNC: 12 MG/DL (ref 8–23)
BUN/CREAT SERPL: 15.2 (ref 7–25)
BUPRENORPHINE SERPL-MCNC: POSITIVE NG/ML
CALCIUM SPEC-SCNC: 9.3 MG/DL (ref 8.6–10.5)
CANNABINOIDS SERPL QL: NEGATIVE
CHLORIDE SERPL-SCNC: 103 MMOL/L (ref 98–107)
CO2 BLDA-SCNC: 27.7 MMOL/L (ref 22–33)
CO2 SERPL-SCNC: 24 MMOL/L (ref 22–29)
COCAINE UR QL: NEGATIVE
COHGB MFR BLD: 1.7 % (ref 0–2)
CREAT SERPL-MCNC: 0.79 MG/DL (ref 0.76–1.27)
CRP SERPL-MCNC: 1.47 MG/DL (ref 0–0.5)
DEPRECATED RDW RBC AUTO: 48.7 FL (ref 37–54)
EGFRCR SERPLBLD CKD-EPI 2021: 97.4 ML/MIN/1.73
EOSINOPHIL # BLD AUTO: 0.48 10*3/MM3 (ref 0–0.4)
EOSINOPHIL NFR BLD AUTO: 4.9 % (ref 0.3–6.2)
EPAP: 0
ERYTHROCYTE [DISTWIDTH] IN BLOOD BY AUTOMATED COUNT: 14.1 % (ref 12.3–15.4)
ERYTHROCYTE [SEDIMENTATION RATE] IN BLOOD: 87 MM/HR (ref 0–20)
ETHANOL BLD-MCNC: <10 MG/DL (ref 0–10)
FENTANYL UR-MCNC: NEGATIVE NG/ML
FERRITIN SERPL-MCNC: 29.84 NG/ML (ref 30–400)
FOLATE SERPL-MCNC: >20 NG/ML (ref 4.78–24.2)
GLUCOSE BLDC GLUCOMTR-MCNC: 109 MG/DL (ref 70–130)
GLUCOSE BLDC GLUCOMTR-MCNC: 116 MG/DL (ref 70–130)
GLUCOSE BLDC GLUCOMTR-MCNC: 123 MG/DL (ref 70–130)
GLUCOSE BLDC GLUCOMTR-MCNC: 139 MG/DL (ref 70–130)
GLUCOSE BLDC GLUCOMTR-MCNC: 202 MG/DL (ref 70–130)
GLUCOSE SERPL-MCNC: 107 MG/DL (ref 65–99)
HBA1C MFR BLD: 6.2 % (ref 4.8–5.6)
HCO3 BLDA-SCNC: 26.5 MMOL/L (ref 20–26)
HCT VFR BLD AUTO: 35.5 % (ref 37.5–51)
HCT VFR BLD CALC: 32.1 % (ref 38–51)
HGB BLD-MCNC: 10.9 G/DL (ref 13–17.7)
HGB BLDA-MCNC: 10.5 G/DL (ref 13.5–17.5)
IMM GRANULOCYTES # BLD AUTO: 0.05 10*3/MM3 (ref 0–0.05)
IMM GRANULOCYTES NFR BLD AUTO: 0.5 % (ref 0–0.5)
INHALED O2 CONCENTRATION: 21 %
IPAP: 0
IRON 24H UR-MRATE: 53 MCG/DL (ref 59–158)
IRON SATN MFR SERPL: 10 % (ref 20–50)
LYMPHOCYTES # BLD AUTO: 2.82 10*3/MM3 (ref 0.7–3.1)
LYMPHOCYTES NFR BLD AUTO: 28.7 % (ref 19.6–45.3)
MCH RBC QN AUTO: 28.9 PG (ref 26.6–33)
MCHC RBC AUTO-ENTMCNC: 30.7 G/DL (ref 31.5–35.7)
MCV RBC AUTO: 94.2 FL (ref 79–97)
METHADONE UR QL SCN: NEGATIVE
METHGB BLD QL: 0.3 % (ref 0–1.5)
MODALITY: ABNORMAL
MONOCYTES # BLD AUTO: 1.5 10*3/MM3 (ref 0.1–0.9)
MONOCYTES NFR BLD AUTO: 15.3 % (ref 5–12)
NEUTROPHILS NFR BLD AUTO: 4.93 10*3/MM3 (ref 1.7–7)
NEUTROPHILS NFR BLD AUTO: 50.3 % (ref 42.7–76)
NRBC BLD AUTO-RTO: 0 /100 WBC (ref 0–0.2)
OPIATES UR QL: NEGATIVE
OXYCODONE UR QL SCN: NEGATIVE
OXYHGB MFR BLDV: 94 % (ref 94–99)
PAW @ PEAK INSP FLOW SETTING VENT: 0 CMH2O
PCO2 BLDA: 39.4 MM HG (ref 35–45)
PCO2 TEMP ADJ BLD: 39.4 MM HG (ref 35–48)
PCP UR QL SCN: NEGATIVE
PH BLDA: 7.44 PH UNITS (ref 7.35–7.45)
PH, TEMP CORRECTED: 7.44 PH UNITS
PLATELET # BLD AUTO: 411 10*3/MM3 (ref 140–450)
PMV BLD AUTO: 10.1 FL (ref 6–12)
PO2 BLDA: 72.3 MM HG (ref 83–108)
PO2 TEMP ADJ BLD: 72.3 MM HG (ref 83–108)
POTASSIUM SERPL-SCNC: 4.5 MMOL/L (ref 3.5–5.2)
QT INTERVAL: 314 MS
QT INTERVAL: 360 MS
QTC INTERVAL: 409 MS
QTC INTERVAL: 415 MS
RBC # BLD AUTO: 3.77 10*6/MM3 (ref 4.14–5.8)
SODIUM SERPL-SCNC: 137 MMOL/L (ref 136–145)
TIBC SERPL-MCNC: 524 MCG/DL (ref 298–536)
TOTAL RATE: 0 BREATHS/MINUTE
TRANSFERRIN SERPL-MCNC: 352 MG/DL (ref 200–360)
TRICYCLICS UR QL SCN: NEGATIVE
VIT B12 BLD-MCNC: 347 PG/ML (ref 211–946)
WBC NRBC COR # BLD AUTO: 9.81 10*3/MM3 (ref 3.4–10.8)

## 2024-03-08 PROCEDURE — 80048 BASIC METABOLIC PNL TOTAL CA: CPT | Performed by: NURSE PRACTITIONER

## 2024-03-08 PROCEDURE — 82948 REAGENT STRIP/BLOOD GLUCOSE: CPT

## 2024-03-08 PROCEDURE — 25010000002 PIPERACILLIN SOD-TAZOBACTAM PER 1 G: Performed by: NURSE PRACTITIONER

## 2024-03-08 PROCEDURE — 82728 ASSAY OF FERRITIN: CPT | Performed by: NURSE PRACTITIONER

## 2024-03-08 PROCEDURE — 71045 X-RAY EXAM CHEST 1 VIEW: CPT

## 2024-03-08 PROCEDURE — 73718 MRI LOWER EXTREMITY W/O DYE: CPT

## 2024-03-08 PROCEDURE — 93971 EXTREMITY STUDY: CPT | Performed by: INTERNAL MEDICINE

## 2024-03-08 PROCEDURE — 96365 THER/PROPH/DIAG IV INF INIT: CPT

## 2024-03-08 PROCEDURE — 25010000002 CEFTRIAXONE PER 250 MG: Performed by: FAMILY MEDICINE

## 2024-03-08 PROCEDURE — 25510000001 IOPAMIDOL 61 % SOLUTION: Performed by: EMERGENCY MEDICINE

## 2024-03-08 PROCEDURE — 73060 X-RAY EXAM OF HUMERUS: CPT

## 2024-03-08 PROCEDURE — 96372 THER/PROPH/DIAG INJ SC/IM: CPT

## 2024-03-08 PROCEDURE — 25010000002 KETOROLAC TROMETHAMINE PER 15 MG: Performed by: FAMILY MEDICINE

## 2024-03-08 PROCEDURE — 36415 COLL VENOUS BLD VENIPUNCTURE: CPT

## 2024-03-08 PROCEDURE — 83540 ASSAY OF IRON: CPT | Performed by: NURSE PRACTITIONER

## 2024-03-08 PROCEDURE — 70551 MRI BRAIN STEM W/O DYE: CPT

## 2024-03-08 PROCEDURE — 25810000003 SODIUM CHLORIDE 0.9 % SOLUTION: Performed by: EMERGENCY MEDICINE

## 2024-03-08 PROCEDURE — 82746 ASSAY OF FOLIC ACID SERUM: CPT | Performed by: NURSE PRACTITIONER

## 2024-03-08 PROCEDURE — 25010000002 NALOXONE PER 1 MG: Performed by: INTERNAL MEDICINE

## 2024-03-08 PROCEDURE — 96376 TX/PRO/DX INJ SAME DRUG ADON: CPT

## 2024-03-08 PROCEDURE — 25010000002 PIPERACILLIN SOD-TAZOBACTAM PER 1 G: Performed by: EMERGENCY MEDICINE

## 2024-03-08 PROCEDURE — 25010000002 VANCOMYCIN PER 500 MG

## 2024-03-08 PROCEDURE — 83036 HEMOGLOBIN GLYCOSYLATED A1C: CPT | Performed by: NURSE PRACTITIONER

## 2024-03-08 PROCEDURE — G0378 HOSPITAL OBSERVATION PER HR: HCPCS

## 2024-03-08 PROCEDURE — 83050 HGB METHEMOGLOBIN QUAN: CPT

## 2024-03-08 PROCEDURE — 84466 ASSAY OF TRANSFERRIN: CPT | Performed by: NURSE PRACTITIONER

## 2024-03-08 PROCEDURE — 82805 BLOOD GASES W/O2 SATURATION: CPT

## 2024-03-08 PROCEDURE — 87040 BLOOD CULTURE FOR BACTERIA: CPT | Performed by: EMERGENCY MEDICINE

## 2024-03-08 PROCEDURE — 25810000003 LACTATED RINGERS PER 1000 ML: Performed by: NURSE PRACTITIONER

## 2024-03-08 PROCEDURE — 25010000002 ENOXAPARIN PER 10 MG: Performed by: EMERGENCY MEDICINE

## 2024-03-08 PROCEDURE — 82607 VITAMIN B-12: CPT | Performed by: NURSE PRACTITIONER

## 2024-03-08 PROCEDURE — 96367 TX/PROPH/DG ADDL SEQ IV INF: CPT

## 2024-03-08 PROCEDURE — 74018 RADEX ABDOMEN 1 VIEW: CPT

## 2024-03-08 PROCEDURE — 82077 ASSAY SPEC XCP UR&BREATH IA: CPT | Performed by: EMERGENCY MEDICINE

## 2024-03-08 PROCEDURE — 25010000002 VANCOMYCIN HCL IN NACL 1.75-0.9 GM/500ML-% SOLUTION: Performed by: EMERGENCY MEDICINE

## 2024-03-08 PROCEDURE — 36600 WITHDRAWAL OF ARTERIAL BLOOD: CPT

## 2024-03-08 PROCEDURE — 93971 EXTREMITY STUDY: CPT

## 2024-03-08 PROCEDURE — 96375 TX/PRO/DX INJ NEW DRUG ADDON: CPT

## 2024-03-08 PROCEDURE — 82375 ASSAY CARBOXYHB QUANT: CPT

## 2024-03-08 PROCEDURE — 70450 CT HEAD/BRAIN W/O DYE: CPT

## 2024-03-08 PROCEDURE — 73701 CT LOWER EXTREMITY W/DYE: CPT

## 2024-03-08 PROCEDURE — 99223 1ST HOSP IP/OBS HIGH 75: CPT | Performed by: INTERNAL MEDICINE

## 2024-03-08 PROCEDURE — 93005 ELECTROCARDIOGRAM TRACING: CPT | Performed by: EMERGENCY MEDICINE

## 2024-03-08 PROCEDURE — 96366 THER/PROPH/DIAG IV INF ADDON: CPT

## 2024-03-08 PROCEDURE — 85025 COMPLETE CBC W/AUTO DIFF WBC: CPT | Performed by: NURSE PRACTITIONER

## 2024-03-08 RX ORDER — ATORVASTATIN CALCIUM 40 MG/1
40 TABLET, FILM COATED ORAL DAILY
Status: DISCONTINUED | OUTPATIENT
Start: 2024-03-08 | End: 2024-03-11 | Stop reason: HOSPADM

## 2024-03-08 RX ORDER — DULOXETIN HYDROCHLORIDE 20 MG/1
20 CAPSULE, DELAYED RELEASE ORAL DAILY
Status: DISCONTINUED | OUTPATIENT
Start: 2024-03-08 | End: 2024-03-11 | Stop reason: HOSPADM

## 2024-03-08 RX ORDER — VANCOMYCIN 1.75 GRAM/500 ML IN 0.9 % SODIUM CHLORIDE INTRAVENOUS
20 ONCE
Qty: 500 ML | Refills: 0 | Status: COMPLETED | OUTPATIENT
Start: 2024-03-08 | End: 2024-03-08

## 2024-03-08 RX ORDER — AMOXICILLIN 250 MG
2 CAPSULE ORAL 2 TIMES DAILY PRN
Status: DISCONTINUED | OUTPATIENT
Start: 2024-03-08 | End: 2024-03-11 | Stop reason: HOSPADM

## 2024-03-08 RX ORDER — ACETAMINOPHEN 650 MG/1
650 SUPPOSITORY RECTAL ONCE
Status: DISCONTINUED | OUTPATIENT
Start: 2024-03-08 | End: 2024-03-08

## 2024-03-08 RX ORDER — BISACODYL 10 MG
10 SUPPOSITORY, RECTAL RECTAL DAILY PRN
Status: DISCONTINUED | OUTPATIENT
Start: 2024-03-08 | End: 2024-03-11 | Stop reason: HOSPADM

## 2024-03-08 RX ORDER — LISINOPRIL 10 MG/1
10 TABLET ORAL DAILY
Status: DISCONTINUED | OUTPATIENT
Start: 2024-03-08 | End: 2024-03-10

## 2024-03-08 RX ORDER — BISACODYL 5 MG/1
5 TABLET, DELAYED RELEASE ORAL DAILY PRN
Status: DISCONTINUED | OUTPATIENT
Start: 2024-03-08 | End: 2024-03-11 | Stop reason: HOSPADM

## 2024-03-08 RX ORDER — ACETAMINOPHEN 325 MG/1
650 TABLET ORAL EVERY 4 HOURS PRN
Status: DISCONTINUED | OUTPATIENT
Start: 2024-03-08 | End: 2024-03-11 | Stop reason: HOSPADM

## 2024-03-08 RX ORDER — IBUPROFEN 600 MG/1
1 TABLET ORAL
Status: DISCONTINUED | OUTPATIENT
Start: 2024-03-08 | End: 2024-03-11 | Stop reason: HOSPADM

## 2024-03-08 RX ORDER — ASPIRIN 81 MG/1
81 TABLET ORAL DAILY
Status: DISCONTINUED | OUTPATIENT
Start: 2024-03-08 | End: 2024-03-11 | Stop reason: HOSPADM

## 2024-03-08 RX ORDER — POLYETHYLENE GLYCOL 3350 17 G/17G
17 POWDER, FOR SOLUTION ORAL DAILY PRN
Status: DISCONTINUED | OUTPATIENT
Start: 2024-03-08 | End: 2024-03-11 | Stop reason: HOSPADM

## 2024-03-08 RX ORDER — KETOROLAC TROMETHAMINE 30 MG/ML
30 INJECTION, SOLUTION INTRAMUSCULAR; INTRAVENOUS EVERY 6 HOURS PRN
Status: DISCONTINUED | OUTPATIENT
Start: 2024-03-08 | End: 2024-03-08

## 2024-03-08 RX ORDER — PANTOPRAZOLE SODIUM 40 MG/1
40 TABLET, DELAYED RELEASE ORAL
Status: DISCONTINUED | OUTPATIENT
Start: 2024-03-08 | End: 2024-03-11 | Stop reason: HOSPADM

## 2024-03-08 RX ORDER — KETOROLAC TROMETHAMINE 15 MG/ML
15 INJECTION, SOLUTION INTRAMUSCULAR; INTRAVENOUS EVERY 6 HOURS PRN
Status: DISCONTINUED | OUTPATIENT
Start: 2024-03-08 | End: 2024-03-11 | Stop reason: HOSPADM

## 2024-03-08 RX ORDER — DEXTROSE MONOHYDRATE 25 G/50ML
25 INJECTION, SOLUTION INTRAVENOUS
Status: DISCONTINUED | OUTPATIENT
Start: 2024-03-08 | End: 2024-03-11 | Stop reason: HOSPADM

## 2024-03-08 RX ORDER — ACETAMINOPHEN 650 MG/1
650 SUPPOSITORY RECTAL EVERY 4 HOURS PRN
Status: DISCONTINUED | OUTPATIENT
Start: 2024-03-08 | End: 2024-03-11 | Stop reason: HOSPADM

## 2024-03-08 RX ORDER — NALOXONE HCL 0.4 MG/ML
0.4 VIAL (ML) INJECTION ONCE
Status: COMPLETED | OUTPATIENT
Start: 2024-03-08 | End: 2024-03-08

## 2024-03-08 RX ORDER — BUPRENORPHINE HYDROCHLORIDE AND NALOXONE HYDROCHLORIDE DIHYDRATE 8; 2 MG/1; MG/1
1 TABLET SUBLINGUAL DAILY
Status: DISCONTINUED | OUTPATIENT
Start: 2024-03-08 | End: 2024-03-09

## 2024-03-08 RX ORDER — ACETAMINOPHEN 160 MG/5ML
650 SOLUTION ORAL EVERY 4 HOURS PRN
Status: DISCONTINUED | OUTPATIENT
Start: 2024-03-08 | End: 2024-03-11 | Stop reason: HOSPADM

## 2024-03-08 RX ORDER — NICOTINE POLACRILEX 4 MG
15 LOZENGE BUCCAL
Status: DISCONTINUED | OUTPATIENT
Start: 2024-03-08 | End: 2024-03-11 | Stop reason: HOSPADM

## 2024-03-08 RX ORDER — ACETAMINOPHEN 325 MG/1
650 TABLET ORAL ONCE
Status: DISCONTINUED | OUTPATIENT
Start: 2024-03-08 | End: 2024-03-08

## 2024-03-08 RX ORDER — VANCOMYCIN HYDROCHLORIDE 1 G/200ML
1000 INJECTION, SOLUTION INTRAVENOUS EVERY 12 HOURS
Status: DISCONTINUED | OUTPATIENT
Start: 2024-03-08 | End: 2024-03-09

## 2024-03-08 RX ORDER — ENOXAPARIN SODIUM 100 MG/ML
1 INJECTION SUBCUTANEOUS ONCE
Status: COMPLETED | OUTPATIENT
Start: 2024-03-08 | End: 2024-03-08

## 2024-03-08 RX ORDER — SODIUM CHLORIDE, SODIUM LACTATE, POTASSIUM CHLORIDE, CALCIUM CHLORIDE 600; 310; 30; 20 MG/100ML; MG/100ML; MG/100ML; MG/100ML
75 INJECTION, SOLUTION INTRAVENOUS CONTINUOUS
Status: DISCONTINUED | OUTPATIENT
Start: 2024-03-08 | End: 2024-03-08

## 2024-03-08 RX ADMIN — ENOXAPARIN SODIUM 80 MG: 80 INJECTION SUBCUTANEOUS at 04:40

## 2024-03-08 RX ADMIN — SODIUM CHLORIDE 1000 ML: 9 INJECTION, SOLUTION INTRAVENOUS at 02:18

## 2024-03-08 RX ADMIN — KETOROLAC TROMETHAMINE 15 MG: 15 INJECTION, SOLUTION INTRAMUSCULAR; INTRAVENOUS at 20:39

## 2024-03-08 RX ADMIN — CEFTRIAXONE 2000 MG: 2 INJECTION, POWDER, FOR SOLUTION INTRAMUSCULAR; INTRAVENOUS at 15:43

## 2024-03-08 RX ADMIN — VANCOMYCIN HYDROCHLORIDE 1000 MG: 1 INJECTION, SOLUTION INTRAVENOUS at 17:20

## 2024-03-08 RX ADMIN — NALXONE HYDROCHLORIDE 0.4 MG: 0.4 INJECTION INTRAMUSCULAR; INTRAVENOUS; SUBCUTANEOUS at 04:58

## 2024-03-08 RX ADMIN — ACETAMINOPHEN 650 MG: 325 TABLET ORAL at 12:42

## 2024-03-08 RX ADMIN — PIPERACILLIN AND TAZOBACTAM 3.38 G: 3; .375 INJECTION, POWDER, LYOPHILIZED, FOR SOLUTION INTRAVENOUS at 02:18

## 2024-03-08 RX ADMIN — BUSPIRONE HYDROCHLORIDE 15 MG: 10 TABLET ORAL at 17:20

## 2024-03-08 RX ADMIN — BUSPIRONE HYDROCHLORIDE 15 MG: 10 TABLET ORAL at 12:30

## 2024-03-08 RX ADMIN — IOPAMIDOL 100 ML: 612 INJECTION, SOLUTION INTRAVENOUS at 01:38

## 2024-03-08 RX ADMIN — KETOROLAC TROMETHAMINE 15 MG: 15 INJECTION, SOLUTION INTRAMUSCULAR; INTRAVENOUS at 12:29

## 2024-03-08 RX ADMIN — Medication 1750 MG: at 04:40

## 2024-03-08 RX ADMIN — BUPRENORPHINE AND NALOXONE 1 TABLET: 8; 2 TABLET SUBLINGUAL at 13:06

## 2024-03-08 RX ADMIN — SODIUM CHLORIDE, POTASSIUM CHLORIDE, SODIUM LACTATE AND CALCIUM CHLORIDE 75 ML/HR: 600; 310; 30; 20 INJECTION, SOLUTION INTRAVENOUS at 09:21

## 2024-03-08 RX ADMIN — PIPERACILLIN SODIUM AND TAZOBACTAM SODIUM 3.38 G: 3; .375 INJECTION, POWDER, LYOPHILIZED, FOR SOLUTION INTRAVENOUS at 09:21

## 2024-03-08 NOTE — H&P
[facility]  HISTORY AND PHYSICAL    Patient Name: Nathalia Jordan  : 1955  MRN: [id number]  Primary Care Physician: Carlita, No Known  Date of admission: 3/7/2024    Subjective   Subjective     Chief Complaint:  Pain/swelling right lower extremity     HPI:  Nathalia Jordan is a 68 y.o. male with past medical history significant for type 2 diabetes mellitus, essential pretension, hyperlipidemia, bipolar disorder, opioid abuse and alcohol abuse presents to the ED with complaints of pain/swelling to right lower extremity.  At the time of my evaluation patient has become altered and is having difficulty staying awake therefore HPI is limited.  Spoke to RN who reports patient was awake and alert initially and then he went for his MRI of his right foot when he became altered.        Review of Systems   Unable to perform ROS: Mental status change                Personal History     Past Medical History:   Diagnosis Date    Anxiety     Depression     Diabetes mellitus     Fibromyalgia, primary     Hypertension     Rheumatoid arthritis              Past Surgical History:   Procedure Laterality Date    AMPUTATION DIGIT Left 2023    Procedure: LEFT 1ST TOE AMPUTATION;  Surgeon: Yomaira Espinoza MD;  Location: [facility];  Service: Vascular;  Laterality: Left;    CYST REMOVAL Right     right knee       Family History:  family history includes Stomach cancer in his mother.     Social History:  reports that he has been smoking cigarettes. He has a 10 pack-year smoking history. His smokeless tobacco use includes snuff. He reports that he does not drink alcohol and does not use drugs.  Social History     Social History Narrative    Not on file       Medications:  DULoxetine, QUEtiapine, SITagliptin, amoxicillin-clavulanate, aspirin, atorvastatin, busPIRone, cyclobenzaprine, doxycycline, lisinopril, metFORMIN, methocarbamol, omeprazole, and pregabalin    No Known  Allergies    Objective   Objective     Vital Signs:   Temp:  [101.2 °F (38.4 °C)] 101.2 °F (38.4 °C)  Heart Rate:  [] 84  Resp:  [16] 16  BP: (132-149)/(61-72) 132/61    Physical Exam  Vitals and nursing note reviewed.   Constitutional:       General: He is not in acute distress.     Appearance: He is ill-appearing. He is not toxic-appearing or diaphoretic.      Comments: Will wake up to sternal rub but quickly falls back asleep   HENT:      Head: Normocephalic and atraumatic.      Nose: Nose normal.      Mouth/Throat:      Mouth: Mucous membranes are dry.      Pharynx: Oropharynx is clear.   Eyes:      Extraocular Movements: Extraocular movements intact.      Conjunctiva/sclera: Conjunctivae normal.      Pupils: Pupils are equal, round, and reactive to light.   Cardiovascular:      Rate and Rhythm: Normal rate and regular rhythm.      Pulses: Normal pulses.      Heart sounds: Murmur heard.   Pulmonary:      Effort: Pulmonary effort is normal.      Breath sounds: Normal breath sounds.   Abdominal:      General: Bowel sounds are normal. There is no distension.      Palpations: Abdomen is soft. There is no mass.      Tenderness: There is no abdominal tenderness. There is no right CVA tenderness, left CVA tenderness, guarding or rebound.      Hernia: No hernia is present.   Musculoskeletal:         General: No swelling, tenderness, deformity or signs of injury. Normal range of motion.      Cervical back: Normal range of motion and neck supple.      Right lower leg: Edema present.      Left lower leg: Edema present.   Skin:     General: Skin is warm and dry.      Comments: There is a small amount of purple discoloration to the medial aspect of the right great toe.  There is erythema extending up to the level of the knee along with edema.   Neurological:      Comments: Unable to assess.  Patient will wake up to sternal rub and quickly falls back asleep.  Will not follow commands.    Psychiatric:      Comments:  Unable to assess             Result Review:  I have personally reviewed the results from the time of this admission to 3/8/2024 04:24 EST and agree with these findings:  [x]  Laboratory list / accordion  [x]  Microbiology  [x]  Radiology  [x]  EKG/Telemetry   []  Cardiology/Vascular   []  Pathology  []  Old records  []  Other:  Most notable findings include:     LAB RESULTS:      Lab 03/07/24  2304   WBC 10.60   HEMOGLOBIN 10.3*   HEMATOCRIT 32.8*   PLATELETS 482*   NEUTROS ABS 5.82   IMMATURE GRANS (ABS) 0.06*   LYMPHS ABS 2.85   MONOS ABS 1.40*   EOS ABS 0.45*   MCV 92.4   CRP 1.47*   PROCALCITONIN 0.08   LACTATE 0.8         Lab 03/07/24  2304   SODIUM 132*   POTASSIUM 4.4   CHLORIDE 97*   CO2 27.0   ANION GAP 8.0   BUN 17   CREATININE 0.87   EGFR 94.6   GLUCOSE 113*   CALCIUM 9.0         Lab 03/07/24  2304   TOTAL PROTEIN 7.3   ALBUMIN 4.1   GLOBULIN 3.2   ALT (SGPT) 18   AST (SGOT) 18   BILIRUBIN <0.2   ALK PHOS 115         Lab 03/07/24  2304   PROBNP 52.5   HSTROP T 27*                 Lab 03/08/24  0259   PH, ARTERIAL 7.436   PCO2, ARTERIAL 39.4   PO2 ART 72.3*   FIO2 21   HCO3 ART 26.5*   BASE EXCESS ART 2.2*   CARBOXYHEMOGLOBIN 1.7     Brief Urine Lab Results  (Last result in the past 365 days)        Color   Clarity   Blood   Leuk Est   Nitrite   Protein   CREAT   Urine HCG        03/07/24 2303 Yellow   Clear   Negative   Negative   Negative   Negative                 Microbiology Results (last 10 days)       Procedure Component Value - Date/Time    COVID PRE-OP / PRE-PROCEDURE SCREENING ORDER (NO ISOLATION) - Swab, Nasopharynx [[id number]]  (Normal) Collected: 03/07/24 2312    Lab Status: Final result Specimen: Swab from Nasopharynx Updated: 03/07/24 2349    Narrative:      The following orders were created for panel order COVID PRE-OP / PRE-PROCEDURE SCREENING ORDER (NO ISOLATION) - Swab, Nasopharynx.  Procedure                               Abnormality         Status                     ---------                                -----------         ------                     COVID-19 and FLU A/B PCR...[[id number]]  Normal              Final result                 Please view results for these tests on the individual orders.    COVID-19 and FLU A/B PCR, 1 HR TAT - Swab, Nasopharynx [[id number]]  (Normal) Collected: 03/07/24 2312    Lab Status: Final result Specimen: Swab from Nasopharynx Updated: 03/07/24 6769     COVID19 Not Detected     Influenza A PCR Not Detected     Influenza B PCR Not Detected    Narrative:      Fact sheet for providers: https://www.fda.gov/media/126871/download    Fact sheet for patients: https://www.fda.gov/media/672862/download    Test performed by PCR.            CT Head Without Contrast    Result Date: 3/8/2024  CT HEAD WO CONTRAST Date of Exam: 3/8/2024 3:43 AM EST Indication: AMS. Comparison: None available. Technique: Axial CT images were obtained of the head without contrast administration.  Automated exposure control and iterative construction methods were used. Findings: There is a chronic right parietal lobe infarct. There is mild chronic small vessel ischemic change. No acute intracranial hemorrhage. No hyperdense vessels. No mass effect, midline shift or abnormal extra-axial collection. There is hypodensity in the right globus pallidus of indeterminate age, left basal ganglia, brainstem and cerebellum appear unremarkable. Orbits appear within normal limits. There is mild maxillary and ethmoid sinus mucosal disease. Mastoids are clear. Superficial soft tissues are unremarkable.     Impression: Impression: 1.No acute intracranial abnormality. 2.Chronic right parietal lobe infarct and mild chronic small vessel ischemic change. 3.Hypodensity in the right globus pallidus of indeterminate age. If there is concern for acute infarct, consider MRI. Electronically Signed: Hiren Villafuerte MD  3/8/2024 3:55 AM EST  Workstation ID: [id number]    CT Lower Extremity Right With Contrast    Result Date:  3/8/2024  CT LOWER EXTREMITY RIGHT W CONTRAST Date of Exam: 3/8/2024 1:26 AM EST Indication: cellulitis of the rle with concern for osteo of the R great toe. Comparison: None available. Technique: Axial CT images were obtained of the right lower extremity after the uneventful intravenous administration of 100 mL Isovue-300.  Reconstructed coronal and sagittal images were also obtained. Automated exposure control and iterative construction methods were used. Findings: There is soft tissue irregularity in the region of the first digit nailbed. There is associated skin thickening and subcutaneous edema most pronounced at the medial aspect of the distal first digit. There is diffuse subcutaneous edema throughout the dorsum of the foot and circumferentially at the level of the ankle and throughout the calcaneal soft tissues. There is no organized soft tissue collection to suggest abscess. There is no deep subcutaneous gas. There is no bone destruction or erosion to suggest osteomyelitis. There are small hindfoot and midfoot osteophytes. Joint spaces are generally preserved throughout. Interphalangeal and MTP joints appear intact. There is three-vessel runoff into the right foot. The Achilles tendon and the flexor and extensor tendons of the foot and ankle appear grossly intact.     Impression: Impression: 1.Soft tissue irregularity in the region of the first digit nailbed. There is associated skin thickening and subcutaneous edema most pronounced at the medial aspect of the distal first digit. 2.No CT evidence of osteomyelitis. 3.There is diffuse subcutaneous edema throughout the foot and ankle. No organized soft tissue collection to suggest abscess. No deep subcutaneous gas. 4.Mild hindfoot and midfoot osteoarthritis. Electronically Signed: Hiren Villafuerte MD  3/8/2024 1:43 AM EST  Workstation ID: [id number]    XR Chest 1 View    Result Date: 3/8/2024  XR CHEST 1 VW Date of Exam: 3/8/2024 12:08 AM EST Indication: sob  Comparison: CT chest 6/27/2023. Findings: There is no pneumothorax, pleural effusion or focal airspace consolidation. Heart size and pulmonary vasculature appear within normal limits. Regional bones appear intact. There is a mixed sclerotic and lucent appearance of the proximal left humerus with  some flattening of the humeral head, which may represent avascular necrosis     Impression: Impression: 1. No acute cardiopulmonary abnormality. 2. 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 Electronically Signed: Hiren Villafuerte MD  3/8/2024 12:27 AM EST  Workstation ID: [id number]     Results for orders placed during the hospital encounter of 06/21/23    Adult Transthoracic Echo Complete W/ Cont if Necessary Per Protocol    Interpretation Summary    : Left ventricular systolic function is normal. Calculated left ventricular EF = 59% Normal left ventricular cavity size noted. Left ventricular wall thickness is consistent with hypertrophy. Sigmoid-shaped ventricular septum is present. All left ventricular wall segments contract normally. Left ventricular diastolic function was normal.    Normal left atrial cavity size noted. Saline test results are negative.      Assessment & Plan   Assessment & Plan       Fever    Type 2 diabetes mellitus with diabetic autonomic neuropathy, without long-term current use of insulin    HTN (hypertension)    HLD (hyperlipidemia)    Bipolar affective disorder    Pain and swelling of right lower extremity    Cellulitis of right lower extremity    Thrombocytosis      68-year-old male presents to the ED with worsening swelling/pain in his right lower extremity.    1)   sepsis        cellulitis of right lower extremity  - Fever, tachycardia, source of infection, thrombocytosis, source of infection  - Blood cultures x 2 pending  - Continue vancomycin and Zosyn  - Obtain venous duplex of right lower extremity in a.m. given treatment dose Lovenox in ED cover for  DVT  - Consider ID consult  - CT right lower extremity with contrast shows soft tissue irregularity in the region of the first digit nailbed.  There is associated skin thickening and subcutaneous edema most pronounced at the medial aspect of the distal first digit.  There is diffuse subcutaneous edema throughout the foot and ankle.  No organized soft tissue collection to suggest abscess.  No deep subcutaneous gas  - MRI of right foot pending  - Case discussed with Ortho, will see in a.m.  - Hold all sedating medications due to new alteration in mental status    2) altered mental status  - Etiology not completely clear  - ABG noted above  - UDS/ethanol pending  - Glucose 116  - UA negative  - CT head noted above  - MRI brain  - Hold sedating medications for now    3) normocytic anemia  - H&H 10.3 and 32.8  - Check anemia studies  - Stable    4) essential hypertension  - On lisinopril    5) diabetes mellitus type 2  - Check hemoglobin A1c  - Hold Januvia and metformin  - Start sliding scale insulin  - Fingersticks before meals and at bedtime    6) bipolar  - Continue BuSpar, Cymbalta    7) hyperlipidemia  - Continue statin    8) ? Avascular necrosis of left humeral head   -noted on CXR  -imaging of left arm pending   -ortho to see in am  - Unsure if patient is having pain to the area due to AMS    9) thrombocytosis  - New from prior studies  - Likely inflammatory response  - A.m. labs         DVT prophylaxis: Received therapeutic Lovenox in the ED    CODE STATUS: Full code  Code Status (Patient has no pulse and is not breathing): CPR (Attempt to Resuscitate)  Medical Interventions (Patient has pulse or is breathing): Full Support      Expected Discharge  TBD     This note has been completed as part of a split-shared workflow.     Signature: Electronically signed by LATESHA Mandel, 03/08/24, 3:32 AM EST.      Attending   Admission Attestation       I have performed an independent face-to-face diagnostic  evaluation including performing an independent physical examination.  I approve of the documented plan of care above that was reviewed and developed with the advanced practice clinician (APC) and take responsibility for that plan along with its associated risks.  I have updated the HPI as appropriate.    Brief HPI    This is a 68-year-old male patient with a PMH significant for diabetes mellitus type 2, HTN, HLD bipolar affective disorder, fibromyalgia, rheumatoid arthritis, history of osteomyelitis of the left great toe s/p amputation comes to the ED with chief complaint of lower extremity swelling for 2 days.  Patient is largely nonverbal, somnolent and arousable at the time my exam, provides no HPI.  HPI obtained from EMR.  Patient complained of right foot and leg swelling with pain, redness over the past 1 day with blistering to the right great toe over the past few days.      Attending Physical Exam:  Temp:  [101.2 °F (38.4 °C)] 101.2 °F (38.4 °C)  Heart Rate:  [] 84  Resp:  [16] 16  BP: (132-149)/(61-72) 132/61    Constitutional: Somnolent, briefly arousable  Eyes: PERRLA, sclerae anicteric, no conjunctival injection  HENT: NCAT, mucous membranes moist  Neck: Supple, no thyromegaly, no lymphadenopathy, trachea midline  Respiratory: Clear to auscultation bilaterally, nonlabored respirations   Cardiovascular: RRR, no murmurs, rubs, or gallops, palpable pedal pulses bilaterally  Gastrointestinal: Positive bowel sounds, soft, nontender, nondistended  Musculoskeletal: 3+ pitting RLE edema, no clubbing or cyanosis to extremities  Psychiatric: Appropriate affect, cooperative  Neurologic: Oriented x 3, strength symmetric in all extremities, Cranial Nerves grossly intact to confrontation, speech clear  Skin: Dark-colored blistering to right great toe      Result Review:  I have personally reviewed the results from the time of this admission to 3/8/2024 04:24 EST and agree with these findings:  [x]  Laboratory  list / accordion  [x]  Microbiology  [x]  Radiology  [x]  EKG/Telemetry   [x]  Cardiology/Vascular   []  Pathology  [x]  Old records      Assessment and Plan:    See assessment and plan documented by APC above and updated/edited by me as appropriate.    Rosita Yi,   03/08/24

## 2024-03-08 NOTE — CONSULTS
[facility]  [address]  [facility] [address]  [phone]       Orthopedic Consult    Patient: Nathalia Jordan    Date of Admission: 3/7/2024 10:28 PM    YOB: 1955    Medical Record Number: [id number]    Attending Physician: Alva Kemp DO    Consulting Physician: Law Kemp Jr, MD    Chief Complaint: Fever [R50.9]    History of Present Illness: 68 y.o. male admitted to [facility] with Fever [R50.9].  He developed worsening foot and calf erythema, presented to the emergency department, was admitted.  He was noted to have thick callus at the medial aspect of his great toe with associated blister.     No Known Allergies     Home Medications:  Medications Prior to Admission   Medication Sig Dispense Refill Last Dose    amoxicillin-clavulanate (AUGMENTIN) 875-125 MG per tablet Take 1 tablet by mouth Every 12 (Twelve) Hours. 14 tablet 0     aspirin 81 MG EC tablet Take 1 tablet by mouth Daily. 30 tablet 0     atorvastatin (LIPITOR) 40 MG tablet Take 1 tablet by mouth Daily.       busPIRone (BUSPAR) 15 MG tablet Take 1 tablet by mouth 3 (Three) Times a Day.       cyclobenzaprine (FLEXERIL) 5 MG tablet Take 1 tablet by mouth 3 (Three) Times a Day As Needed for Muscle Spasms. Take 1 tablet by mouth every 8 hours as needed.       doxycycline (MONODOX) 100 MG capsule Take 1 capsule by mouth 2 (Two) Times a Day. 14 capsule 0     DULoxetine (CYMBALTA) 20 MG capsule Take 1 capsule by mouth Daily.       lisinopril (PRINIVIL,ZESTRIL) 10 MG tablet Take 1 tablet by mouth Daily. 30 tablet 0     metFORMIN (GLUCOPHAGE) 1000 MG tablet Take 1 tablet by mouth 2 (Two) Times a Day With Meals.       methocarbamol (ROBAXIN) 500 MG tablet Take 1 tablet by mouth 4 (Four) Times a Day.       omeprazole (priLOSEC) 20 MG capsule Take 1 capsule by mouth Daily. 30 capsule 0     pregabalin (LYRICA) 200 MG capsule Take 1 capsule by mouth Daily. 7 capsule 0     QUEtiapine (SEROquel) 25 MG tablet Take 1  tablet by mouth Every Night.       SITagliptin (JANUVIA) 100 MG tablet Take 1 tablet by mouth Daily.          Current Medications:  Scheduled Meds:[START ON 3/10/2024] Pharmacy Consult, , Does not apply, Once  acetaminophen, 650 mg, Rectal, Once  aspirin, 81 mg, Oral, Daily  atorvastatin, 40 mg, Oral, Daily  busPIRone, 15 mg, Oral, TID  DULoxetine, 20 mg, Oral, Daily  insulin regular, 2-7 Units, Subcutaneous, Q6H  [Held by provider] lisinopril, 10 mg, Oral, Daily  pantoprazole, 40 mg, Oral, Q AM  piperacillin-tazobactam, 3.375 g, Intravenous, Q8H  vancomycin, 1,000 mg, Intravenous, Q12H      Continuous Infusions:lactated ringers, 75 mL/hr, Last Rate: 75 mL/hr (03/08/24 0921)  Pharmacy to dose vancomycin,       PRN Meds:.  acetaminophen **OR** acetaminophen **OR** acetaminophen    senna-docusate sodium **AND** polyethylene glycol **AND** bisacodyl **AND** bisacodyl    dextrose    dextrose    glucagon (human recombinant)    ketorolac    Pharmacy to dose vancomycin    Past Medical History:   Diagnosis Date    Anxiety     Depression     Diabetes mellitus     Fibromyalgia, primary     Hypertension     Rheumatoid arthritis         Past Surgical History:   Procedure Laterality Date    AMPUTATION DIGIT Left 6/26/2023    Procedure: LEFT 1ST TOE AMPUTATION;  Surgeon: Yomaira Espinoza MD;  Location: [facility];  Service: Vascular;  Laterality: Left;    CYST REMOVAL Right 1999    right knee        Social History     Occupational History    Not on file   Tobacco Use    Smoking status: Every Day     Current packs/day: 1.00     Average packs/day: 1 pack/day for 10.0 years (10.0 ttl pk-yrs)     Types: Cigarettes    Smokeless tobacco: Current     Types: Snuff   Vaping Use    Vaping status: Never Used   Substance and Sexual Activity    Alcohol use: No    Drug use: No    Sexual activity: Defer      Social History     Social History Narrative    Not on file        Family History   Problem Relation Age of Onset    Stomach cancer  Mother          Review of Systems:   HEENT: Patient denies any headaches, vision changes, change in hearing, or tinnitus, Patient denies any rhinorrhea,epistaxis, sinus pain, mouth or dental problems, sore throat or hoarseness, or dysphagia  Pulmonary: Patient denies any cough, congestion, SOA, or wheezing  Cardiovascular: Patient denies any chest pain, dyspnea, palpitations, weakness, intolerance of exercise, varicosities, swelling of extremities, known murmur  Gastrointestinal:  Patient denies nausea, vomiting, diarrhea, constipation, loss  of appetite, change in appetite, dysphagia, gas, heartburn, melena, change in bowel habits, use of laxatives or other drugs to alter the function of the gastrointestinal tract.  Genital/Urinary: Patient denies dysuria, change in color of urine, change in frequency of urination, pain with urgency, incontinence, retention, or nocturia.  Musculoskeletal: Patient denies increased warmth; redness; or swelling of joints; limitation of function; deformity; crepitation: pain in a joint or an extremity, the neck, or the back, especially with movement.  Neurological: Patient denies dizziness, tremor, ataxia, difficulty in speaking, change in speech, paresthesia, loss of sensation, seizures, syncope, changes in memory.  Endocrine system: Patient denies tremors, palpitations, intolerance of heat or cold, polyuria, polydipsia, polyphagia, diaphoresis, exophthalmos, or goiter.  Psychological: Patient denies thoughts/plans or harming self or other; depression,  insomnia, night terrors, rob, memory loss, disorientation.  Skin: Patient denies any bruising, rashes, discoloration, pruritus, wounds, ulcers, decubiti, changes in the hair or nails  Hematopoietic: Patient denies history of spontaneous or excessive bleeding, epistaxis, hematuria, melena, fatigue, enlarged or tender lymph nodes, pallor, history of anemia.    Physical Exam: 68 y.o. male  General Appearance:    Alert, cooperative, in  "no acute distress                   Vitals:    03/08/24 0305 03/08/24 0459 03/08/24 0641 03/08/24 0840   BP:   159/81    BP Location:   Right arm    Patient Position:   Lying    Pulse: 84  96    Resp:   18    Temp:  98.9 °F (37.2 °C) 98.8 °F (37.1 °C)    TempSrc:  Axillary Oral    SpO2: 93%  97%    Weight:   86.1 kg (189 lb 13.1 oz) 86.1 kg (189 lb 13.1 oz)   Height:   190.5 cm (75\") 190.5 cm (75\")        Head:    Normocephalic, without obvious abnormality, atraumatic   Eyes:            Lids and lashes normal, conjunctivae and sclerae normal, no icterus, no pallor, corneas clear, PERRLA   Ears:    Ears appear intact with no abnormalities noted   Throat:   No oral lesions, no thrush, oral mucosa moist   Back:     No C-T-L spine tenderness   Lungs:     Clear to auscultation,respirations regular, even and                unlabored   Chest Wall:    No abnormalities observed   Abdomen:     Normal bowel sounds, no masses, no organomegaly, soft      non-tender, non-distended, no guarding, no rebound              tenderness   Extremities: Right lower extremity, diffuse edema about the foot, past the ankle.  He has cellulitis, no crepitus, no fluctuance.  He has a small blister adjacent to his large medial toe callus.   Pulses:   Pulses palpable and equal bilaterally   Skin:   No bleeding, bruising or rash   Lymph nodes:   No palpable adenopathy   Neurologic:  Cranial nerves 2 - 12 grossly intact, sensation intact, DTR     present and equal bilaterally           Diagnostic Tests:    Admission on 03/07/2024   Component Date Value Ref Range Status    Glucose 03/07/2024 113 (H)  65 - 99 mg/dL Final    BUN 03/07/2024 17  8 - 23 mg/dL Final    Creatinine 03/07/2024 0.87  0.76 - 1.27 mg/dL Final    Sodium 03/07/2024 132 (L)  136 - 145 mmol/L Final    Potassium 03/07/2024 4.4  3.5 - 5.2 mmol/L Final    Slight hemolysis detected by analyzer. Result may be falsely elevated.    Chloride 03/07/2024 97 (L)  98 - 107 mmol/L Final    CO2 " 03/07/2024 27.0  22.0 - 29.0 mmol/L Final    Calcium 03/07/2024 9.0  8.6 - 10.5 mg/dL Final    Total Protein 03/07/2024 7.3  6.0 - 8.5 g/dL Final    Albumin 03/07/2024 4.1  3.5 - 5.2 g/dL Final    ALT (SGPT) 03/07/2024 18  1 - 41 U/L Final    AST (SGOT) 03/07/2024 18  1 - 40 U/L Final    Alkaline Phosphatase 03/07/2024 115  39 - 117 U/L Final    Total Bilirubin 03/07/2024 <0.2  0.0 - 1.2 mg/dL Final    Globulin 03/07/2024 3.2  gm/dL Final    Calculated Result    A/G Ratio 03/07/2024 1.3  g/dL Final    BUN/Creatinine Ratio 03/07/2024 19.5  7.0 - 25.0 Final    Anion Gap 03/07/2024 8.0  5.0 - 15.0 mmol/L Final    eGFR 03/07/2024 94.6  >60.0 mL/min/1.73 Final    proBNP 03/07/2024 52.5  0.0 - 900.0 pg/mL Final    HS Troponin T 03/07/2024 27 (H)  <22 ng/L Final    Color, UA 03/07/2024 Yellow  Yellow, Straw Final    Appearance, UA 03/07/2024 Clear  Clear Final    pH, UA 03/07/2024 7.5  5.0 - 8.0 Final    Specific Gravity, UA 03/07/2024 1.012  1.001 - 1.030 Final    Glucose, UA 03/07/2024 Negative  Negative Final    Ketones, UA 03/07/2024 Negative  Negative Final    Bilirubin, UA 03/07/2024 Negative  Negative Final    Blood, UA 03/07/2024 Negative  Negative Final    Protein, UA 03/07/2024 Negative  Negative Final    Leuk Esterase, UA 03/07/2024 Negative  Negative Final    Nitrite, UA 03/07/2024 Negative  Negative Final    Urobilinogen, UA 03/07/2024 1.0 E.U./dL  0.2 - 1.0 E.U./dL Final    Lactate 03/07/2024 0.8  0.5 - 2.0 mmol/L Final    Falsely depressed results may occur on samples drawn from patients receiving N-Acetylcysteine (NAC) or Metamizole.    Procalcitonin 03/07/2024 0.08  0.00 - 0.25 ng/mL Final    COVID19 03/07/2024 Not Detected  Not Detected - Ref. Range Final    Influenza A PCR 03/07/2024 Not Detected  Not Detected Final    Influenza B PCR 03/07/2024 Not Detected  Not Detected Final    WBC 03/07/2024 10.60  3.40 - 10.80 10*3/mm3 Final    RBC 03/07/2024 3.55 (L)  4.14 - 5.80 10*6/mm3 Final    Hemoglobin  03/07/2024 10.3 (L)  13.0 - 17.7 g/dL Final    Hematocrit 03/07/2024 32.8 (L)  37.5 - 51.0 % Final    MCV 03/07/2024 92.4  79.0 - 97.0 fL Final    MCH 03/07/2024 29.0  26.6 - 33.0 pg Final    MCHC 03/07/2024 31.4 (L)  31.5 - 35.7 g/dL Final    RDW 03/07/2024 14.0  12.3 - 15.4 % Final    RDW-SD 03/07/2024 47.8  37.0 - 54.0 fl Final    MPV 03/07/2024 10.1  6.0 - 12.0 fL Final    Platelets 03/07/2024 482 (H)  140 - 450 10*3/mm3 Final    Neutrophil % 03/07/2024 54.9  42.7 - 76.0 % Final    Lymphocyte % 03/07/2024 26.9  19.6 - 45.3 % Final    Monocyte % 03/07/2024 13.2 (H)  5.0 - 12.0 % Final    Eosinophil % 03/07/2024 4.2  0.3 - 6.2 % Final    Basophil % 03/07/2024 0.2  0.0 - 1.5 % Final    Immature Grans % 03/07/2024 0.6 (H)  0.0 - 0.5 % Final    Neutrophils, Absolute 03/07/2024 5.82  1.70 - 7.00 10*3/mm3 Final    Lymphocytes, Absolute 03/07/2024 2.85  0.70 - 3.10 10*3/mm3 Final    Monocytes, Absolute 03/07/2024 1.40 (H)  0.10 - 0.90 10*3/mm3 Final    Eosinophils, Absolute 03/07/2024 0.45 (H)  0.00 - 0.40 10*3/mm3 Final    Basophils, Absolute 03/07/2024 0.02  0.00 - 0.20 10*3/mm3 Final    Immature Grans, Absolute 03/07/2024 0.06 (H)  0.00 - 0.05 10*3/mm3 Final    nRBC 03/07/2024 0.0  0.0 - 0.2 /100 WBC Final    QT Interval 03/07/2024 314  ms Preliminary    QTC Interval 03/07/2024 409  ms Preliminary    Right Common Femoral Spont 03/08/2024 Y   In process    Right Common Femoral Phasic 03/08/2024 Y   In process    Right Common Femoral Compress 03/08/2024 C   In process    Right Common Femoral Augment 03/08/2024 Y   In process    Right Saphenofemoral Junction Comp* 03/08/2024 C   In process    Right Profunda Femoral Compress 03/08/2024 C   In process    Right Proximal Femoral Compress 03/08/2024 C   In process    Right Mid Femoral Spont 03/08/2024 Y   In process    Right Mid Femoral Phasic 03/08/2024 Y   In process    Right Mid Femoral Compress 03/08/2024 C   In process    Right Mid Femoral Augment 03/08/2024 Y   In  process    Right Distal Femoral Compress 03/08/2024 C   In process    Right Popliteal Spont 03/08/2024 Y   In process    Right Popliteal Phasic 03/08/2024 Y   In process    Right Popliteal Compress 03/08/2024 C   In process    Right Popliteal Augment 03/08/2024 Y   In process    Right Posterior Tibial Compress 03/08/2024 C   In process    Right Peroneal Compress 03/08/2024 C   In process    Right Gastronemius Compress 03/08/2024 C   In process    Right Greater Saph AK Compress 03/08/2024 C   In process    Right Greater Saph BK Compress 03/08/2024 C   In process    Right Lesser Saph Compress 03/08/2024 C   In process    Left Common Femoral Spont 03/08/2024 Y   In process    Left Common Femoral Phasic 03/08/2024 Y   In process    Left Common Femoral Compress 03/08/2024 C   In process    Left Common Femoral Augment 03/08/2024 Y   In process    C-Reactive Protein 03/07/2024 1.47 (H)  0.00 - 0.50 mg/dL Final    Sed Rate 03/07/2024 87 (H)  0 - 20 mm/hr Final    Ethanol 03/08/2024 <10  0 - 10 mg/dL Final    Glucose 03/08/2024 116  70 - 130 mg/dL Final    Site 03/08/2024 Right Brachial   Final    Nate's Test 03/08/2024 N/A   Final    pH, Arterial 03/08/2024 7.436  7.350 - 7.450 pH units Final    pCO2, Arterial 03/08/2024 39.4  35.0 - 45.0 mm Hg Final    pO2, Arterial 03/08/2024 72.3 (L)  83.0 - 108.0 mm Hg Final    84 Value below reference range    HCO3, Arterial 03/08/2024 26.5 (H)  20.0 - 26.0 mmol/L Final    Base Excess, Arterial 03/08/2024 2.2 (H)  0.0 - 2.0 mmol/L Final    Hemoglobin, Blood Gas 03/08/2024 10.5 (L)  13.5 - 17.5 g/dL Final    84 Value below reference range    Hematocrit, Blood Gas 03/08/2024 32.1 (L)  38.0 - 51.0 % Final    Oxyhemoglobin 03/08/2024 94.0  94 - 99 % Final    Methemoglobin 03/08/2024 0.30  0.00 - 1.50 % Final    Carboxyhemoglobin 03/08/2024 1.7  0 - 2 % Final    CO2 Content 03/08/2024 27.7  22 - 33 mmol/L Final    Temperature 03/08/2024 37.0   Final    Barometric Pressure for Blood Gas  03/08/2024    Final    N/A    Modality 03/08/2024 Room Air   Final    FIO2 03/08/2024 21  % Final    Rate 03/08/2024 0  Breaths/minute Final    PIP 03/08/2024 0  cmH2O Final    Meter: [id number]     :  [id number]    IPAP 03/08/2024 0   Final    EPAP 03/08/2024 0   Final    pH, Temp Corrected 03/08/2024 7.436  pH Units Final    pCO2, Temperature Corrected 03/08/2024 39.4  35 - 48 mm Hg Final    pO2, Temperature Corrected 03/08/2024 72.3 (L)  83 - 108 mm Hg Final    QT Interval 03/08/2024 360  ms Preliminary    QTC Interval 03/08/2024 415  ms Preliminary    THC, Screen, Urine 03/07/2024 Negative  Negative Final    Phencyclidine (PCP), Urine 03/07/2024 Negative  Negative Final    Cocaine Screen, Urine 03/07/2024 Negative  Negative Final    Methamphetamine, Ur 03/07/2024 Negative  Negative Final    Opiate Screen 03/07/2024 Negative  Negative Final    Amphetamine Screen, Urine 03/07/2024 Negative  Negative Final    Benzodiazepine Screen, Urine 03/07/2024 Negative  Negative Final    Tricyclic Antidepressants Screen 03/07/2024 Negative  Negative Final    Methadone Screen, Urine 03/07/2024 Negative  Negative Final    Barbiturates Screen, Urine 03/07/2024 Negative  Negative Final    Oxycodone Screen, Urine 03/07/2024 Negative  Negative Final    Buprenorphine, Screen, Urine 03/07/2024 Positive (A)  Negative Final    Fentanyl, Urine 03/07/2024 Negative  Negative Final    WBC 03/08/2024 9.81  3.40 - 10.80 10*3/mm3 Final    RBC 03/08/2024 3.77 (L)  4.14 - 5.80 10*6/mm3 Final    Hemoglobin 03/08/2024 10.9 (L)  13.0 - 17.7 g/dL Final    Hematocrit 03/08/2024 35.5 (L)  37.5 - 51.0 % Final    MCV 03/08/2024 94.2  79.0 - 97.0 fL Final    MCH 03/08/2024 28.9  26.6 - 33.0 pg Final    MCHC 03/08/2024 30.7 (L)  31.5 - 35.7 g/dL Final    RDW 03/08/2024 14.1  12.3 - 15.4 % Final    RDW-SD 03/08/2024 48.7  37.0 - 54.0 fl Final    MPV 03/08/2024 10.1  6.0 - 12.0 fL Final    Platelets 03/08/2024 411  140 - 450 10*3/mm3 Final     Neutrophil % 03/08/2024 50.3  42.7 - 76.0 % Final    Lymphocyte % 03/08/2024 28.7  19.6 - 45.3 % Final    Monocyte % 03/08/2024 15.3 (H)  5.0 - 12.0 % Final    Eosinophil % 03/08/2024 4.9  0.3 - 6.2 % Final    Basophil % 03/08/2024 0.3  0.0 - 1.5 % Final    Immature Grans % 03/08/2024 0.5  0.0 - 0.5 % Final    Neutrophils, Absolute 03/08/2024 4.93  1.70 - 7.00 10*3/mm3 Final    Lymphocytes, Absolute 03/08/2024 2.82  0.70 - 3.10 10*3/mm3 Final    Monocytes, Absolute 03/08/2024 1.50 (H)  0.10 - 0.90 10*3/mm3 Final    Eosinophils, Absolute 03/08/2024 0.48 (H)  0.00 - 0.40 10*3/mm3 Final    Basophils, Absolute 03/08/2024 0.03  0.00 - 0.20 10*3/mm3 Final    Immature Grans, Absolute 03/08/2024 0.05  0.00 - 0.05 10*3/mm3 Final    nRBC 03/08/2024 0.0  0.0 - 0.2 /100 WBC Final    Glucose 03/08/2024 107 (H)  65 - 99 mg/dL Final    BUN 03/08/2024 12  8 - 23 mg/dL Final    Creatinine 03/08/2024 0.79  0.76 - 1.27 mg/dL Final    Sodium 03/08/2024 137  136 - 145 mmol/L Final    Potassium 03/08/2024 4.5  3.5 - 5.2 mmol/L Final    Chloride 03/08/2024 103  98 - 107 mmol/L Final    CO2 03/08/2024 24.0  22.0 - 29.0 mmol/L Final    Calcium 03/08/2024 9.3  8.6 - 10.5 mg/dL Final    BUN/Creatinine Ratio 03/08/2024 15.2  7.0 - 25.0 Final    Anion Gap 03/08/2024 10.0  5.0 - 15.0 mmol/L Final    eGFR 03/08/2024 97.4  >60.0 mL/min/1.73 Final    Hemoglobin A1C 03/08/2024 6.20 (H)  4.80 - 5.60 % Final    Ferritin 03/08/2024 29.84 (L)  30.00 - 400.00 ng/mL Final    Iron 03/08/2024 53 (L)  59 - 158 mcg/dL Final    Iron Saturation (TSAT) 03/08/2024 10 (L)  20 - 50 % Final    Transferrin 03/08/2024 352  200 - 360 mg/dL Final    TIBC 03/08/2024 524  298 - 536 mcg/dL Final    Glucose 03/08/2024 109  70 - 130 mg/dL Final       MRI Foot Right Without Contrast    Result Date: 3/8/2024  Narrative: MRI FOOT RIGHT WO CONTRAST Date of Exam: 3/8/2024 6:02 AM EST Indication: eval for osteo of the R great toe.  Comparison: None available. Technique:  Routine  multiplanar/multisequence sequence images of the right foot were obtained without contrast administration. Findings: Scattered edema is noted throughout the subcutaneous soft tissues of the first through fifth digits, forefoot, and midfoot. The changes are most pronounced throughout the subcutaneous soft tissues along the dorsal aspect of the midfoot. The findings are nonspecific but suggest changes of soft tissue cellulitis. There is also edema at the nailbed of the great toe which suggests potential involvement of the nailbed. A cutaneous fluid collection is seen along the medial aspect of the great toe suggesting a  blister measuring 0.7 x 1.0 x 0.2 cm. No additional fluid collection is seen to indicate additional potential abscess formation. No cortical erosion or destruction is observed. No periostitis is identified. No abnormal bone marrow signal is observed. There are no definitive signs of osteomyelitis.     Impression: Impression: 1.Soft tissue edema throughout the first through fifth digits and extending into the midfoot. The findings are most pronounced in the subcutaneous soft tissues along the dorsal aspect of the midfoot. The findings are nonspecific but suggest changes of soft tissue cellulitis. 2.Evidence for a blister involving the cutaneous soft tissues at the medial aspect of the great toe measuring up to 1 cm. Otherwise there is no evidence for additional abscess. 3.No evidence for osteomyelitis. Electronically Signed: Alphonse Verdin MD  3/8/2024 7:12 AM EST  Workstation ID: [id number]    MRI Brain Without Contrast    Result Date: 3/8/2024  Narrative: MRI BRAIN WO CONTRAST Date of Exam: 3/8/2024 5:37 AM EST Indication: AMS.  Comparison: CT head 3/8/2024. Technique:  Routine multiplanar/multisequence sequence images of the brain were obtained without contrast administration. Findings: There is no diffusion restriction to suggest acute infarct. No mass effect, midline shift or abnormal extra-axial  collection. There is a chronic right parietal lobe infarct. There is a chronic infarct in the right basal ganglia accounting for the hypodensity on CT. There are other scattered patchy and small punctate foci of FLAIR hyperintense signal within the cerebral white matter. There is a tiny chronic lacunar infarct in the left basal ganglia. There is a chronic lacunar type infarct in the inferior left cerebellum. There is no acute or chronic intracranial hemorrhage. The midline structures appear intact. Calvarial and superficial soft tissue signal is within normal limits. Orbits appear unremarkable. There is mild diffuse paranasal sinus mucosal disease. Mastoid air cells are well aerated. Major arterial flow voids appear intact.     Impression: Impression: 1.No acute intracranial abnormality. 2.Chronic infarcts in the right parietal lobe, right basal ganglia and left cerebellum. 3.Mild chronic small vessel ischemic change. 4.Mild diffuse paranasal sinus mucosal disease. Electronically Signed: Hiren Villafuerte MD  3/8/2024 6:30 AM EST  Workstation ID: [id number]    XR Abdomen KUB    Result Date: 3/8/2024  Narrative: XR ABDOMEN KUB Date of Exam: 3/8/2024 4:40 AM EST Indication: MRI CLEARENCE Comparison: CT head 3/8/2024 and chest radiograph same date. Findings: There is no unexpected metal artifact. There is colonic fecal retention. Reevaluation of chest radiograph demonstrates no unexpected metal artifact. Evaluation of prior head CT demonstrates no unexpected metal artifact.     Impression: Impression: No contraindication to MRI. No contraindication to MRI on head CT or chest radiograph either. Electronically Signed: Hiren Villafuerte MD  3/8/2024 4:58 AM EST  Workstation ID: [id number]    XR Humerus Left    Result Date: 3/8/2024  Narrative: XR HUMERUS LEFT Date of Exam: 3/8/2024 4:09 AM EST Indication: ? avascular necrosis seen on CXR of left humeral head Comparison: None available. Findings: There is an old healed left humeral  neck fracture. There is avascular necrosis of the left humeral head with cortical collapse. There is glenohumeral joint degeneration. The acromioclavicular joint appears within normal limits. Adjacent lung and ribs appear intact. There is maintenance of the acromiohumeral and coracoclavicular distances.     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 2.Old healed left humeral neck fracture. Electronically Signed: Hiren Villafuerte MD  3/8/2024 4:25 AM EST  Workstation ID: [id number]    CT Head Without Contrast    Result Date: 3/8/2024  Narrative: CT HEAD WO CONTRAST Date of Exam: 3/8/2024 3:43 AM EST Indication: AMS. Comparison: None available. Technique: Axial CT images were obtained of the head without contrast administration.  Automated exposure control and iterative construction methods were used. Findings: There is a chronic right parietal lobe infarct. There is mild chronic small vessel ischemic change. No acute intracranial hemorrhage. No hyperdense vessels. No mass effect, midline shift or abnormal extra-axial collection. There is hypodensity in the right globus pallidus of indeterminate age, left basal ganglia, brainstem and cerebellum appear unremarkable. Orbits appear within normal limits. There is mild maxillary and ethmoid sinus mucosal disease. Mastoids are clear. Superficial soft tissues are unremarkable.     Impression: Impression: 1.No acute intracranial abnormality. 2.Chronic right parietal lobe infarct and mild chronic small vessel ischemic change. 3.Hypodensity in the right globus pallidus of indeterminate age. If there is concern for acute infarct, consider MRI. Electronically Signed: Hiren Villafuerte MD  3/8/2024 3:55 AM EST  Workstation ID: [id number]    CT Lower Extremity Right With Contrast    Result Date: 3/8/2024  Narrative: CT LOWER EXTREMITY RIGHT W CONTRAST Date of Exam: 3/8/2024 1:26 AM EST Indication: cellulitis of the rle with concern for  osteo of the R great toe. Comparison: None available. Technique: Axial CT images were obtained of the right lower extremity after the uneventful intravenous administration of 100 mL Isovue-300.  Reconstructed coronal and sagittal images were also obtained. Automated exposure control and iterative construction methods were used. Findings: There is soft tissue irregularity in the region of the first digit nailbed. There is associated skin thickening and subcutaneous edema most pronounced at the medial aspect of the distal first digit. There is diffuse subcutaneous edema throughout the dorsum of the foot and circumferentially at the level of the ankle and throughout the calcaneal soft tissues. There is no organized soft tissue collection to suggest abscess. There is no deep subcutaneous gas. There is no bone destruction or erosion to suggest osteomyelitis. There are small hindfoot and midfoot osteophytes. Joint spaces are generally preserved throughout. Interphalangeal and MTP joints appear intact. There is three-vessel runoff into the right foot. The Achilles tendon and the flexor and extensor tendons of the foot and ankle appear grossly intact.     Impression: Impression: 1.Soft tissue irregularity in the region of the first digit nailbed. There is associated skin thickening and subcutaneous edema most pronounced at the medial aspect of the distal first digit. 2.No CT evidence of osteomyelitis. 3.There is diffuse subcutaneous edema throughout the foot and ankle. No organized soft tissue collection to suggest abscess. No deep subcutaneous gas. 4.Mild hindfoot and midfoot osteoarthritis. Electronically Signed: Hiren Villafuerte MD  3/8/2024 1:43 AM EST  Workstation ID: [id number]    XR Chest 1 View    Result Date: 3/8/2024  Narrative: XR CHEST 1 VW Date of Exam: 3/8/2024 12:08 AM EST Indication: sob Comparison: CT chest 6/27/2023. Findings: There is no pneumothorax, pleural effusion or focal airspace consolidation. Heart  size and pulmonary vasculature appear within normal limits. Regional bones appear intact. There is a mixed sclerotic and lucent appearance of the proximal left humerus with  some flattening of the humeral head, which may represent avascular necrosis     Impression: Impression: 1. No acute cardiopulmonary abnormality. 2. 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 Electronically Signed: Hiren Villafuerte MD  3/8/2024 12:27 AM EST  Workstation ID: [id number]    XR Foot 3+ View Left    Result Date: 2/25/2024  Narrative: XR FOOT 3+ VW LEFT Date of Exam: 2/25/2024 8:53 PM EST Indication: pain Comparison: Left foot July 17, 2023 Findings: There are postoperative changes from amputation of the first digit at the level of the metatarsophalangeal joint. There is partial amputation of the second digit at the level of the proximal aspect of the middle phalanx. There are healing fractures which  are displaced involving the distal third of the second metatarsal and the distal aspect of the third metatarsal. There are no definite acute fractures. There is diffuse soft tissue swelling.     Impression: Impression: Postoperative changes from amputation of the first and second digits. Healing fractures of the distal second and third metatarsals. Electronically Signed: Brian Ferreira MD  2/25/2024 9:49 PM EST  Workstation ID: [id number]    CT Foot Left wo IV Contrast    Result Date: 2/22/2024  Narrative: CLINICAL INDICATION: acute on chronic L foot pain, concern for progressive fx TECHNIQUE:   Multiple axial CT images were obtained through left foot lower extremity. The axial CT data set was used to generate high resolution reformatted images in the coronal and sagittal planes to facilitate diagnostic accuracy and treatment planning. Total DLP (Dose-Length Product): 157 mGy.cm. Please note: The reported value represents the total of one or more individual components during the CT acquisition on this  date and at this time, and as such, the same value may appear in more than one CT report depending on the interpreting/reporting physicians.   COMPARISON: Radiograph of the same day and on 2/17/2024. FINDINGS: Chronic fractures are present at the distal shaft of the third and fourth metatarsals with overlying callus formation similar in alignment to the previous radiographs. There also appears to be a healing fracture involving the distal aspect of the fourth metatarsal. There is been amputation at the metatarsophalangeal joint of the first digit and at the proximal aspect of the middle phalanx of the second digit. There is generalized soft tissue swelling at the fracture sites. No acute bony erosive changes are evident.    Impression: There are displaced fractures at the distal shaft of the  third and fourth metatarsals and at the distal aspect of the fifth metatarsal. Callus formation is present at these fracture sites consistent with chronic/healing fractures. No acute bony erosive changes apparent. Generalized soft tissue swelling CRITICAL RESULT:   No. COMMUNICATION: Per this written report. Drafted by Nate Barnard MD on 2/22/2024 7:42 PM Final report signed by Nate Barnard MD on 2/22/2024 7:52 PM    CT Lumbar Spine Without Contrast    Result Date: 2/22/2024  Narrative: CLINICAL INDICATION: acute on chronic midline spine pain last MRI June '23 TECHNIQUE: Imaging of the lumbar spine was performed, using spiral technique, without contrast administration. Reformatted images in the coronal and sagittal planes were generated from the axial data set to facilitate diagnostic accuracy and/or surgical planning. Total DLP (Dose-Length Product): 831.45 mGy.cm. Please note: The reported value represents the total of one or more individual components during the CT acquisition on this date and at this time, and as such, the same value may appear in more than one CT report depending on the interpreting/reporting physicians.  COMPARISON: Radiograph of 5/29/2023 FINDINGS:   Vertebrae: Chronic degenerative disc disease of the lumbar spine and L1-L2 to L3-L4 and L4-L5. Sclerosis of the L2 vertebral body and endplates at L1-L2 and L2-L3 with mild wedging of L2 similar to the previous radiograph. Multilevel facet arthrosis Alignment: Lumbar scoliosis convex left. Paraspinal Soft Tissues: No paraspinal mass or abscess. 1.5 cm opacity in the right perirectal region on most inferior image suggesting an enlarged lymph node versus the top of a seminal vesicle.    Impression: Multilevel degenerative and chronic changes as described above. No acute fracture or aggressive bone lesion. CRITICAL RESULT:   No. COMMUNICATION: Per this written report. Drafted by Nate Barnard MD on 2/22/2024 6:10 PM Final report signed by Nate Barnard MD on 2/22/2024 6:15 PM    XR Ankle 3+ View Left    Result Date: 2/22/2024  Narrative: CLINICAL INDICATION: ankle pain TECHNIQUE: XR ANKLE LEFT 3+ VIEWS, XR FOOT LEFT 3+ VIEWS COMPARISON: None. FINDINGS: No acute fracture or dislocation of the ankle. Talar dome is intact. Ankle mortise is preserved. There is swelling and apparent deformity of the soft tissues of the anterior ankle. There has been AP dictation of the first toe from the level of the metatarsophalangeal joint and indication of the second toe from the level of the base of the middle phalanx. There is diffuse osteopenia of the foot. Limited evaluation of the third and fourth toes secondary to contracture. There is an apparent acute on chronic displaced fracture of the mid diaphysis of the second metatarsal with slightly increased medial displacement and angulation of the distal fracture fragment. There is an unchanged displaced fracture through the neck of the third metatarsal with medial displacement of the distal fracture fragments. There is a nondisplaced displaced fracture through the neck of the fourth metatarsal. There is a chronic appearing fracture through  the base of the fifth metatarsal. Mild diffuse soft tissue swelling of the distal foot.    Impression: Fractures of the second through fourth metatarsals as described above. CRITICAL RESULT:   No. COMMUNICATION: Per this written report. Drafted by Carin Arambula MD on 2/22/2024 5:40 PM Final report signed by Carin Arambula MD on 2/22/2024 5:46 PM    XR Foot 3+ View Left    Result Date: 2/22/2024  Narrative: CLINICAL INDICATION: ankle pain TECHNIQUE: XR ANKLE LEFT 3+ VIEWS, XR FOOT LEFT 3+ VIEWS COMPARISON: None. FINDINGS: No acute fracture or dislocation of the ankle. Talar dome is intact. Ankle mortise is preserved. There is swelling and apparent deformity of the soft tissues of the anterior ankle. There has been AP dictation of the first toe from the level of the metatarsophalangeal joint and indication of the second toe from the level of the base of the middle phalanx. There is diffuse osteopenia of the foot. Limited evaluation of the third and fourth toes secondary to contracture. There is an apparent acute on chronic displaced fracture of the mid diaphysis of the second metatarsal with slightly increased medial displacement and angulation of the distal fracture fragment. There is an unchanged displaced fracture through the neck of the third metatarsal with medial displacement of the distal fracture fragments. There is a nondisplaced displaced fracture through the neck of the fourth metatarsal. There is a chronic appearing fracture through the base of the fifth metatarsal. Mild diffuse soft tissue swelling of the distal foot.    Impression: Fractures of the second through fourth metatarsals as described above. CRITICAL RESULT:   No. COMMUNICATION: Per this written report. Drafted by Carin Arambula MD on 2/22/2024 5:40 PM Final report signed by Carin Arambula MD on 2/22/2024 5:46 PM    XR Knee 3 View Right    Result Date: 2/17/2024  Narrative: CLINICAL INDICATION: pain TECHNIQUE: XR KNEE LEFT 3 VIEWS  COMPARISON: None. FINDINGS: No acute fracture or dislocation. Mild tricompartmental joint space narrowing. Unremarkable patella. No suprapatellar effusion.    Impression: Mild osteoarthrosis of the knee. No acute fracture or dislocation. CRITICAL RESULT:   No. COMMUNICATION: Per this written report. Preliminary report signed by Dale Monteiro M.D. on 2/17/2024 8:58 PM By electronically signing this report, I, the attending physician, attest that I have personally reviewed the images/data for the above examination(s) and agree with the final edited report. Drafted by Dale Monteiro M.D. on 2/17/2024 8:56 PM Final report signed by Pablito Head MD on 2/17/2024 9:10 PM    XR Foot 3+ View Left    Result Date: 2/17/2024  Narrative: CLINICAL INDICATION: pain left foot, recent fracture 12/23 TECHNIQUE: XR FOOT LEFT 3+ VIEWS COMPARISON: May 17, 2023 FINDINGS: Prior amputation of first digit at the metatarsophalangeal joint. There is a healing fracture of the second metatarsal shaft with a more acute appearing fracture of the third metatarsal shaft.    Impression: Prior amputation of first digit at the metatarsophalangeal joint. There is a healing fracture of the second metatarsal shaft with a more acute appearing fracture of the third metatarsal shaft. CRITICAL RESULT:   No. COMMUNICATION: Per this written report. Drafted by Pablito Head MD on 2/17/2024 7:22 PM Final report signed by Pablito Head MD on 2/17/2024 7:23 PM    XR Knee 3 View Left    Result Date: 2/13/2024  Narrative: LEFT KNEE 3 VIEW(S) INDICATION / CLINICAL INFORMATION: fall COMPARISON: None available. FINDINGS: BONES / JOINT(S): No acute fracture or subluxation. No significant arthritis. SOFT TISSUES: Mild prepatellar soft tissue swelling suggesting bursitis ADDITIONAL FINDINGS: None.    Impression: No acute osseous abnormality. Electronically signed by:  Micheal Silva DO  02/13/2024 03:34 PM EST Workstation: [id number]    XR Foot 3+ View  Left    Result Date: 2/13/2024  Narrative: LEFT FOOT 3 VIEW(S) LEFT ANKLE 3 VIEWS INDICATION / CLINICAL INFORMATION: fall COMPARISON: None available. FINDINGS: BONES / JOINT(S): There is comminution of the distal third metatarsal and mid second metatarsal with moderate callus formation suggesting chronic fracture. No acute fracture is identified. Patient is status post amputation of the first digit distal aspect of the second digit. SOFT TISSUES: No significant abnormality. ADDITIONAL FINDINGS: None.    Impression: No acute osseous abnormality. Electronically signed by:  Micheal Silva DO  02/13/2024 03:33 PM EST Workstation: [id number]    XR Ankle 3+ View Left    Result Date: 2/13/2024  Narrative: LEFT FOOT 3 VIEW(S) LEFT ANKLE 3 VIEWS INDICATION / CLINICAL INFORMATION: fall COMPARISON: None available. FINDINGS: BONES / JOINT(S): There is comminution of the distal third metatarsal and mid second metatarsal with moderate callus formation suggesting chronic fracture. No acute fracture is identified. Patient is status post amputation of the first digit distal aspect of the second digit. SOFT TISSUES: No significant abnormality. ADDITIONAL FINDINGS: None.    Impression: No acute osseous abnormality. Electronically signed by:  Micheal Silva DO  02/13/2024 03:33 PM EST Workstation: [id number]       Assessment:    Fever    Type 2 diabetes mellitus with diabetic autonomic neuropathy, without long-term current use of insulin    HTN (hypertension)    HLD (hyperlipidemia)    Bipolar affective disorder    Pain and swelling of right lower extremity    Cellulitis of right lower extremity    Thrombocytosis       68-year-old male with right great toe callus/blister, small, right foot and right leg cellulitis.    Plan: His MRI demonstrated no osteomyelitis or abscess.  Clinically, his exam is benign currently.  If he develops blistering on the foot or up the leg, would plan for debridement and irrigation, likely after  repeat imaging to evaluate for inflammation along fascial planes.  For now, plan for close observation, empiric antibiotics.  Okay for weightbearing as tolerated.            Date: 3/8/2024    Law Kemp Jr, MD

## 2024-03-08 NOTE — ED NOTES
Nathalia Jordan    Nursing Report ED to Floor:  Mental status: AXO X4   Ambulatory status: STAND BY ASSIST  Oxygen Therapy:  ROOM AIR  Cardiac Rhythm: NSR  Admitted from: HOME  Safety Concerns:  FALL RISK  Social Issues: NONE  ED Room #:  7    ED Nurse Phone Extension - [phone] or may call [phone].      HPI:   Chief Complaint   Patient presents with    Leg Swelling       Past Medical History:  Past Medical History:   Diagnosis Date    Anxiety     Depression     Diabetes mellitus     Fibromyalgia, primary     Hypertension     Rheumatoid arthritis         Past Surgical History:  Past Surgical History:   Procedure Laterality Date    AMPUTATION DIGIT Left 6/26/2023    Procedure: LEFT 1ST TOE AMPUTATION;  Surgeon: Yomaira Espinoza MD;  Location: [facility] OR;  Service: Vascular;  Laterality: Left;    CYST REMOVAL Right 1999    right knee        Admitting Doctor:   Rosita Yi DO    Consulting Provider(s):  Consults       No orders found for last 30 day(s).             Admitting Diagnosis:   The primary encounter diagnosis was Cellulitis of right leg. Diagnoses of Acute sepsis and History of diabetes mellitus were also pertinent to this visit.    Most Recent Vitals:   Vitals:    03/08/24 0206 03/08/24 0235 03/08/24 0239 03/08/24 0305   BP:  132/61     BP Location:       Patient Position:       Pulse: 92  86 84   Resp:       Temp:       TempSrc:       SpO2: 92%  92% 93%   Weight:       Height:           Active LDAs/IV Access:   Lines, Drains & Airways       Active LDAs       Name Placement date Placement time Site Days    Peripheral IV 03/07/24 2259 Right Antecubital 03/07/24 2259  Antecubital  less than 1                    Labs (abnormal labs have a star):   Labs Reviewed   COMPREHENSIVE METABOLIC PANEL - Abnormal; Notable for the following components:       Result Value    Glucose 113 (*)     Sodium 132 (*)     Chloride 97 (*)     All other components within normal limits    Narrative:     GFR Normal >60  Chronic  Kidney Disease <60  Kidney Failure <15     SINGLE HSTROPONIN T - Abnormal; Notable for the following components:    HS Troponin T 27 (*)     All other components within normal limits    Narrative:     High Sensitive Troponin T Reference Range:  <14.0 ng/L- Negative Female for AMI  <22.0 ng/L- Negative Male for AMI  >=14 - Abnormal Female indicating possible myocardial injury.  >=22 - Abnormal Male indicating possible myocardial injury.   Clinicians would have to utilize clinical acumen, EKG, Troponin, and serial changes to determine if it is an Acute Myocardial Infarction or myocardial injury due to an underlying chronic condition.        CBC WITH AUTO DIFFERENTIAL - Abnormal; Notable for the following components:    RBC 3.55 (*)     Hemoglobin 10.3 (*)     Hematocrit 32.8 (*)     MCHC 31.4 (*)     Platelets 482 (*)     Monocyte % 13.2 (*)     Immature Grans % 0.6 (*)     Monocytes, Absolute 1.40 (*)     Eosinophils, Absolute 0.45 (*)     Immature Grans, Absolute 0.06 (*)     All other components within normal limits   C-REACTIVE PROTEIN - Abnormal; Notable for the following components:    C-Reactive Protein 1.47 (*)     All other components within normal limits   BLOOD GAS, ARTERIAL W/CO-OXIMETRY - Abnormal; Notable for the following components:    pO2, Arterial 72.3 (*)     HCO3, Arterial 26.5 (*)     Base Excess, Arterial 2.2 (*)     Hemoglobin, Blood Gas 10.5 (*)     Hematocrit, Blood Gas 32.1 (*)     pO2, Temperature Corrected 72.3 (*)     All other components within normal limits   COVID-19 AND FLU A/B, NP SWAB IN TRANSPORT MEDIA 1 HR TAT - Normal    Narrative:     Fact sheet for providers: https://www.fda.gov/media/592093/download    Fact sheet for patients: https://www.fda.gov/media/445257/download    Test performed by PCR.   BNP (IN-HOUSE) - Normal    Narrative:     This assay is used as an aid in the diagnosis of individuals suspected of having heart failure. It can be used as an aid in the diagnosis of  "acute decompensated heart failure (ADHF) in patients presenting with signs and symptoms of ADHF to the emergency department (ED). In addition, NT-proBNP of <300 pg/mL indicates ADHF is not likely.    Age Range Result Interpretation  NT-proBNP Concentration (pg/mL:      <50             Positive            >450                   Gray                 300-450                    Negative             <300    50-75           Positive            >900                  Gray                300-900                  Negative            <300      >75             Positive            >1800                  Gray                300-1800                  Negative            <300   URINALYSIS W/ MICROSCOPIC IF INDICATED (NO CULTURE) - Normal    Narrative:     Urine microscopic not indicated.   LACTIC ACID, PLASMA - Normal   PROCALCITONIN - Normal    Narrative:     As a Marker for Sepsis (Non-Neonates):    1. <0.5 ng/mL represents a low risk of severe sepsis and/or septic shock.  2. >2 ng/mL represents a high risk of severe sepsis and/or septic shock.    As a Marker for Lower Respiratory Tract Infections that require antibiotic therapy:    PCT on Admission    Antibiotic Therapy       6-12 Hrs later    >0.5                Strongly Recommended  >0.25 - <0.5        Recommended   0.1 - 0.25          Discouraged              Remeasure/reassess PCT  <0.1                Strongly Discouraged     Remeasure/reassess PCT    As 28 day mortality risk marker: \"Change in Procalcitonin Result\" (>80% or <=80%) if Day 0 (or Day 1) and Day 4 values are available. Refer to http://www.[employer]-pct-calculator.com    Change in PCT <=80%  A decrease of PCT levels below or equal to 80% defines a positive change in PCT test result representing a higher risk for 28-day all-cause mortality of patients diagnosed with severe sepsis for septic shock.    Change in PCT >80%  A decrease of PCT levels of more than 80% defines a negative change in PCT result representing a " lower risk for 28-day all-cause mortality of patients diagnosed with severe sepsis or septic shock.      POCT GLUCOSE FINGERSTICK - Normal   COVID PRE-OP / PRE-PROCEDURE SCREENING ORDER (NO ISOLATION)    Narrative:     The following orders were created for panel order COVID PRE-OP / PRE-PROCEDURE SCREENING ORDER (NO ISOLATION) - Swab, Nasopharynx.  Procedure                               Abnormality         Status                     ---------                               -----------         ------                     COVID-19 and FLU A/B PCR...[[id number]]  Normal              Final result                 Please view results for these tests on the individual orders.   BLOOD CULTURE   BLOOD CULTURE   BLOOD GAS, ARTERIAL   SEDIMENTATION RATE   ETHANOL   GLUCOSE, RANDOM   URINE DRUG SCREEN   POCT GLUCOSE FINGERSTICK   CBC AND DIFFERENTIAL    Narrative:     The following orders were created for panel order CBC & Differential.  Procedure                               Abnormality         Status                     ---------                               -----------         ------                     CBC Auto Differential[[id number]]        Abnormal            Final result                 Please view results for these tests on the individual orders.       Meds Given in ED:   Medications   vancomycin IVPB 1750 mg in 0.9% Sodium Chloride (premix) 500 mL (has no administration in time range)   Enoxaparin Sodium (LOVENOX) syringe 80 mg (has no administration in time range)   acetaminophen (TYLENOL) suppository 650 mg (has no administration in time range)   piperacillin-tazobactam (ZOSYN) 3.375 g IVPB in 100 mL NS MBP (CD) (0 g Intravenous Stopped 3/8/24 0341)   iopamidol (ISOVUE-300) 61 % injection 100 mL (100 mL Intravenous Given 3/8/24 0138)   sodium chloride 0.9 % bolus 1,000 mL (1,000 mL Intravenous New Bag 3/8/24 0218)

## 2024-03-08 NOTE — ED PROVIDER NOTES
[place]    EMERGENCY DEPARTMENT ENCOUNTER      Pt Name: Nathalia Jordan  MRN: [id number]  YOB: 1955  Date of evaluation: 3/7/2024  Provider: Micheal Salmon MD    CHIEF COMPLAINT       Chief Complaint   Patient presents with    Leg Swelling         HISTORY OF PRESENT ILLNESS   Nathalia Jordan is a 68 y.o. male who presents to the emergency department with right foot and leg swelling along with pain and redness over the course of the past day.  Denies any fever or chills.  Reports some blistering to the right great toe over the last few days as well.  He has history of diabetes mellitus and previous toe amputation due to osteomyelitis.      Nursing notes were reviewed.    REVIEW OF SYSTEMS     ROS:  A chief complaint appropriate review of systems was completed and is negative except as noted in the HPI.      PAST MEDICAL HISTORY     Past Medical History:   Diagnosis Date    Anxiety     Depression     Diabetes mellitus     Fibromyalgia, primary     Hypertension     Rheumatoid arthritis          SURGICAL HISTORY       Past Surgical History:   Procedure Laterality Date    AMPUTATION DIGIT Left 6/26/2023    Procedure: LEFT 1ST TOE AMPUTATION;  Surgeon: Yomaira Espinoza MD;  Location: [facility];  Service: Vascular;  Laterality: Left;    CYST REMOVAL Right 1999    right knee         CURRENT MEDICATIONS       Current Facility-Administered Medications:     Enoxaparin Sodium (LOVENOX) syringe 80 mg, 1 mg/kg, Subcutaneous, Once, Micheal Salmon MD    vancomycin IVPB 1750 mg in 0.9% Sodium Chloride (premix) 500 mL, 20 mg/kg, Intravenous, Once, Micheal Salmon MD    Current Outpatient Medications:     amoxicillin-clavulanate (AUGMENTIN) 875-125 MG per tablet, Take 1 tablet by mouth Every 12 (Twelve) Hours., Disp: 14 tablet, Rfl: 0    aspirin 81 MG EC tablet, Take 1 tablet by mouth Daily., Disp: 30 tablet, Rfl: 0    atorvastatin (LIPITOR) 40 MG tablet, Take 1 tablet by mouth Daily., Disp: , Rfl:      busPIRone (BUSPAR) 15 MG tablet, Take 1 tablet by mouth 3 (Three) Times a Day., Disp: , Rfl:     cyclobenzaprine (FLEXERIL) 5 MG tablet, Take 1 tablet by mouth 3 (Three) Times a Day As Needed for Muscle Spasms. Take 1 tablet by mouth every 8 hours as needed., Disp: , Rfl:     doxycycline (MONODOX) 100 MG capsule, Take 1 capsule by mouth 2 (Two) Times a Day., Disp: 14 capsule, Rfl: 0    DULoxetine (CYMBALTA) 20 MG capsule, Take 1 capsule by mouth Daily., Disp: , Rfl:     lisinopril (PRINIVIL,ZESTRIL) 10 MG tablet, Take 1 tablet by mouth Daily., Disp: 30 tablet, Rfl: 0    metFORMIN (GLUCOPHAGE) 1000 MG tablet, Take 1 tablet by mouth 2 (Two) Times a Day With Meals., Disp: , Rfl:     methocarbamol (ROBAXIN) 500 MG tablet, Take 1 tablet by mouth 4 (Four) Times a Day., Disp: , Rfl:     omeprazole (priLOSEC) 20 MG capsule, Take 1 capsule by mouth Daily., Disp: 30 capsule, Rfl: 0    pregabalin (LYRICA) 200 MG capsule, Take 1 capsule by mouth Daily., Disp: 7 capsule, Rfl: 0    QUEtiapine (SEROquel) 25 MG tablet, Take 1 tablet by mouth Every Night., Disp: , Rfl:     SITagliptin (JANUVIA) 100 MG tablet, Take 1 tablet by mouth Daily., Disp: , Rfl:     ALLERGIES     Patient has no known allergies.    FAMILY HISTORY       Family History   Problem Relation Age of Onset    Stomach cancer Mother           SOCIAL HISTORY       Social History     Socioeconomic History    Marital status:    Tobacco Use    Smoking status: Every Day     Current packs/day: 1.00     Average packs/day: 1 pack/day for 10.0 years (10.0 ttl pk-yrs)     Types: Cigarettes    Smokeless tobacco: Current     Types: Snuff   Vaping Use    Vaping status: Never Used   Substance and Sexual Activity    Alcohol use: No    Drug use: No    Sexual activity: Defer         PHYSICAL EXAM    (up to 7 for level 4, 8 or more for level 5)     Vitals:    03/08/24 0151 03/08/24 0156 03/08/24 0201 03/08/24 0206   BP:       BP Location:       Patient Position:       Pulse: 93  95 93 92   Resp:       Temp:       TempSrc:       SpO2: 93% 92% 93% 92%   Weight:       Height:           General: Awake, alert, no acute distress.  HEENT: Conjunctivae normal.  Neck: Trachea midline.  Cardiac: Tachycardic, regular  Lungs: Normal effort  Chest wall: No deformity  Abdomen: Non-distended  Musculoskeletal: No deformity.  Pulses 2+ in the right foot and comparable to the opposite side.  Neuro: Alert and oriented x 4.  Dermatology: There is a small amount of blistering and some discoloration to the medial aspect of the right great toe.  There is no crepitus, blistering, or necrosis.  There is induration and confluent blanching erythema extending up to the level of the knee along with edema.  Psych: Mentation is grossly normal, cognition is grossly normal. Affect is appropriate.        DIAGNOSTIC RESULTS     EKG: All EKGs are interpreted by the Emergency Department Physician who either signs or Co-signs this chart in the absence of a cardiologist.    ECG 12 Lead Other; edema   Preliminary Result   Test Reason : Other~   Blood Pressure :   */*   mmHG   Vent. Rate : 102 BPM     Atrial Rate : 102 BPM      P-R Int : 168 ms          QRS Dur :  82 ms       QT Int : 314 ms       P-R-T Axes :  41   3  53 degrees      QTc Int : 409 ms      Sinus tachycardia   Minimal voltage criteria for LVH, may be normal variant   Cannot rule out Anterior infarct , age undetermined   Abnormal ECG   No previous ECGs available      Referred By: JOLYNN           Confirmed By:             RADIOLOGY:   [x] Radiologist's Report Reviewed:  CT Lower Extremity Right With Contrast   Final Result   Impression:   1.Soft tissue irregularity in the region of the first digit nailbed. There is associated skin thickening and subcutaneous edema most pronounced at the medial aspect of the distal first digit.   2.No CT evidence of osteomyelitis.   3.There is diffuse subcutaneous edema throughout the foot and ankle. No organized soft tissue collection  to suggest abscess. No deep subcutaneous gas.   4.Mild hindfoot and midfoot osteoarthritis.            Electronically Signed: Hiren Villafuerte MD     3/8/2024 1:43 AM EST     Workstation ID: [id number]      XR Chest 1 View   Final Result   Impression:      1. No acute cardiopulmonary abnormality.   2. 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            Electronically Signed: Hiren Villafuerte MD     3/8/2024 12:27 AM EST     Workstation ID: [id number]      MRI Foot Right Without Contrast    (Results Pending)       I ordered and independently reviewed the above noted radiographic studies.        LABS:    I have reviewed and interpreted all of the currently available lab results from this visit (if applicable):  Results for orders placed or performed during the hospital encounter of 03/07/24   COVID-19 and FLU A/B PCR, 1 HR TAT - Swab, Nasopharynx    Specimen: Nasopharynx; Swab   Result Value Ref Range    COVID19 Not Detected Not Detected - Ref. Range    Influenza A PCR Not Detected Not Detected    Influenza B PCR Not Detected Not Detected   Comprehensive Metabolic Panel    Specimen: Blood   Result Value Ref Range    Glucose 113 (H) 65 - 99 mg/dL    BUN 17 8 - 23 mg/dL    Creatinine 0.87 0.76 - 1.27 mg/dL    Sodium 132 (L) 136 - 145 mmol/L    Potassium 4.4 3.5 - 5.2 mmol/L    Chloride 97 (L) 98 - 107 mmol/L    CO2 27.0 22.0 - 29.0 mmol/L    Calcium 9.0 8.6 - 10.5 mg/dL    Total Protein 7.3 6.0 - 8.5 g/dL    Albumin 4.1 3.5 - 5.2 g/dL    ALT (SGPT) 18 1 - 41 U/L    AST (SGOT) 18 1 - 40 U/L    Alkaline Phosphatase 115 39 - 117 U/L    Total Bilirubin <0.2 0.0 - 1.2 mg/dL    Globulin 3.2 gm/dL    A/G Ratio 1.3 g/dL    BUN/Creatinine Ratio 19.5 7.0 - 25.0    Anion Gap 8.0 5.0 - 15.0 mmol/L    eGFR 94.6 >60.0 mL/min/1.73   BNP    Specimen: Blood   Result Value Ref Range    proBNP 52.5 0.0 - 900.0 pg/mL   Single High Sensitivity Troponin T    Specimen: Blood   Result Value Ref Range    HS  Troponin T 27 (H) <22 ng/L   Urinalysis With Microscopic If Indicated (No Culture) - Urine, Clean Catch    Specimen: Urine, Clean Catch   Result Value Ref Range    Color, UA Yellow Yellow, Straw    Appearance, UA Clear Clear    pH, UA 7.5 5.0 - 8.0    Specific Gravity, UA 1.012 1.001 - 1.030    Glucose, UA Negative Negative    Ketones, UA Negative Negative    Bilirubin, UA Negative Negative    Blood, UA Negative Negative    Protein, UA Negative Negative    Leuk Esterase, UA Negative Negative    Nitrite, UA Negative Negative    Urobilinogen, UA 1.0 E.U./dL 0.2 - 1.0 E.U./dL   Lactic Acid, Plasma    Specimen: Blood   Result Value Ref Range    Lactate 0.8 0.5 - 2.0 mmol/L   Procalcitonin    Specimen: Blood   Result Value Ref Range    Procalcitonin 0.08 0.00 - 0.25 ng/mL   CBC Auto Differential    Specimen: Blood   Result Value Ref Range    WBC 10.60 3.40 - 10.80 10*3/mm3    RBC 3.55 (L) 4.14 - 5.80 10*6/mm3    Hemoglobin 10.3 (L) 13.0 - 17.7 g/dL    Hematocrit 32.8 (L) 37.5 - 51.0 %    MCV 92.4 79.0 - 97.0 fL    MCH 29.0 26.6 - 33.0 pg    MCHC 31.4 (L) 31.5 - 35.7 g/dL    RDW 14.0 12.3 - 15.4 %    RDW-SD 47.8 37.0 - 54.0 fl    MPV 10.1 6.0 - 12.0 fL    Platelets 482 (H) 140 - 450 10*3/mm3    Neutrophil % 54.9 42.7 - 76.0 %    Lymphocyte % 26.9 19.6 - 45.3 %    Monocyte % 13.2 (H) 5.0 - 12.0 %    Eosinophil % 4.2 0.3 - 6.2 %    Basophil % 0.2 0.0 - 1.5 %    Immature Grans % 0.6 (H) 0.0 - 0.5 %    Neutrophils, Absolute 5.82 1.70 - 7.00 10*3/mm3    Lymphocytes, Absolute 2.85 0.70 - 3.10 10*3/mm3    Monocytes, Absolute 1.40 (H) 0.10 - 0.90 10*3/mm3    Eosinophils, Absolute 0.45 (H) 0.00 - 0.40 10*3/mm3    Basophils, Absolute 0.02 0.00 - 0.20 10*3/mm3    Immature Grans, Absolute 0.06 (H) 0.00 - 0.05 10*3/mm3    nRBC 0.0 0.0 - 0.2 /100 WBC   C-reactive Protein    Specimen: Blood   Result Value Ref Range    C-Reactive Protein 1.47 (H) 0.00 - 0.50 mg/dL   ECG 12 Lead Other; edema   Result Value Ref Range    QT Interval 314 ms     QTC Interval 409 ms        If labs were ordered, I independently reviewed the results and considered them in treating the patient.      EMERGENCY DEPARTMENT COURSE and DIFFERENTIAL DIAGNOSIS/MDM:   Vitals:  AS OF 02:56 EST    BP - 145/66  HR - 92  TEMP - (!) 101.2 °F (38.4 °C) (Oral)  O2 SATS - 92%        Discussion below represents my analysis of pertinent findings related to patient's condition, differential diagnosis, treatment plan and final disposition.      Differential diagnosis:  The differential diagnosis associated with the patient's presentation includes: Cellulitis, osteomyelitis, DVT, NSTI      Independent interpretations (ECG/rhythm strip/X-ray/US/CT scan): I independently interpreted the patient's right leg CT and cardiac monitor.  There is no evidence of bone destruction and the patient is in sinus tachycardia.      Patient's care impacted by:   [x] Diabetes   [] Hypertension   [] Coronary Artery Disease   [] Cancer   [x] Other: History of previous toe amputation    Care significantly affected by Social Determinants of Health (housing and economic circumstances, unemployment)    [] Yes     [x] No   If yes, Patient's care significantly limited by  Social Determinants of Health including:    [] Inadequate housing    [] Low income    [] Alcoholism and drug addiction in family    [] Problems related to primary support group    [] Unemployment    [] Problems related to employment    [] Other Social Determinants of Health:       Consideration of admission/observation vs discharge: Patient presents with sepsis secondary to right lower extremity cellulitis and warrants admission      I considered prescription management with:    [] Pain medication:   [] Antiviral:   [x] Antibiotic: Patient started on vancomycin and Zosyn   [] Other:    ED Course:    ED Course as of 03/08/24 0256   Fri Mar 08, 2024   0217 This patient has a right leg cellulitis extending up to the knee on the right. He has significant  swelling at the foot and ankle extending upwards. Febrile to 101, initially tachycardic but slightly improved currently. His lab work looks okay. He has a history of osteomyelitis with prior toe amputation on the left foot. His labs look okay. Have ordered vancomycin and Zosyn. He has a small amount of blistering and bluish discoloration at the medial aspect of the right great toe that I think is probably the origin for his cellulitis. His CT scan does not show any soft tissue gas and there is no evidence of bone destruction on the CT. I have ordered an MRI of the foot to be safe. If you do not feel comfortable taking it without the MRI let me know and I can have Juan followed up. [NS]   0247 I discussed with orthopedics Dr. Kemp.  Discussed patient history, presentation, and workup.  Will consult. [NS]   0253 I discussed with hospitalist Dr. Guthrie.  Discussed history, presentation, workup.  Accepts patient for admission. [NS]      ED Course User Index  [NS] Micheal Salmon MD         PROCEDURES:  Procedures    CRITICAL CARE TIME    Approximately 35 minutes of discontinuous critical care time was provided to this patient by myself absent of any time spent performing procedures.  Patient presents critically ill with acute sepsis secondary to right lower extremity cellulitis in the setting of a history of diabetes mellitus placing the cardiovascular, respiratory, neurologic, renal systems at risk requiring the following interventions: Broad-spectrum IV antibiotics, interpretation of labs and imaging, frequent reassessment, specialist consultation, coordination of admission.  Patient at high risk of deterioration and possibly death without these interventions.      FINAL IMPRESSION      1. Cellulitis of right leg    2. Acute sepsis    3. History of diabetes mellitus          DISPOSITION/PLAN     ED Disposition       ED Disposition   Decision to Admit    Condition   --    Comment   --                 Comment:  Please note this report has been produced using speech recognition software.      Micheal Salmon MD  Attending Emergency Physician             Micheal Salmon MD  03/08/24 4605

## 2024-03-08 NOTE — PROGRESS NOTES
"Pharmacy Consult-Vancomycin Dosing  Nathalia Jordan is a  68 y.o. male receiving vancomycin therapy.     Indication: Sepsis; cellulitis of right lower extremity   Consulting Provider: Radha CHARLTON  ID Consult:     Goal AUC: 400 - 600 mg/L*hr    Current Antimicrobial Therapy  Anti-Infectives (From admission, onward)      Ordered     Dose/Rate Route Frequency Start Stop    03/08/24 0654  vancomycin (VANCOCIN) 1000 mg/200 mL dextrose 5% IVPB        Ordering Provider: Himanshu Gross, [facility]    1,000 mg  over 60 Minutes Intravenous Every 12 Hours 03/08/24 1800 03/13/24 1759    03/08/24 0645  piperacillin-tazobactam (ZOSYN) 3.375 g IVPB in 100 mL NS MBP (CD)        Ordering Provider: Radha Resendez APRN    3.375 g  over 4 Hours Intravenous Every 8 Hours 03/08/24 0800 03/12/24 0759    03/08/24 0645  Pharmacy to dose vancomycin        Ordering Provider: Radha Resendez APRN     Does not apply Continuous PRN 03/08/24 0645 03/13/24 0744    03/08/24 0107  vancomycin IVPB 1750 mg in 0.9% Sodium Chloride (premix) 500 mL        Ordering Provider: Micheal Salmon MD    20 mg/kg × 84.8 kg  285.7 mL/hr over 105 Minutes Intravenous Once 03/08/24 0123 03/08/24 0625    03/08/24 0107  piperacillin-tazobactam (ZOSYN) 3.375 g IVPB in 100 mL NS MBP (CD)        Ordering Provider: Micheal Salmon MD    3.375 g  over 30 Minutes Intravenous Once 03/08/24 0123 03/08/24 0341            Allergies  Allergies as of 03/07/2024    (No Known Allergies)       Labs    Results from last 7 days   Lab Units 03/07/24  2304   BUN mg/dL 17   CREATININE mg/dL 0.87       Results from last 7 days   Lab Units 03/07/24  2304   WBC 10*3/mm3 10.60       Evaluation of Dosing     Last Dose Received in the ED/Outside Facility:  Vancomycin 1750 mg IV x 1 (3/8/24 04:40)  Is Patient on Dialysis or Renal Replacement:     Ht - 190.5 cm (75\")  Wt - 86.1 kg (189 lb 13.1 oz)    Estimated Creatinine Clearance: 100.3 mL/min (by C-G formula based on SCr of 0.87 " mg/dL).    No intake/output data recorded.    Microbiology and Radiology  Microbiology Results (last 10 days)       Procedure Component Value - Date/Time    COVID PRE-OP / PRE-PROCEDURE SCREENING ORDER (NO ISOLATION) - Swab, Nasopharynx [[id number]]  (Normal) Collected: 03/07/24 2312    Lab Status: Final result Specimen: Swab from Nasopharynx Updated: 03/07/24 2349    Narrative:      The following orders were created for panel order COVID PRE-OP / PRE-PROCEDURE SCREENING ORDER (NO ISOLATION) - Swab, Nasopharynx.  Procedure                               Abnormality         Status                     ---------                               -----------         ------                     COVID-19 and FLU A/B PCR...[[id number]]  Normal              Final result                 Please view results for these tests on the individual orders.    COVID-19 and FLU A/B PCR, 1 HR TAT - Swab, Nasopharynx [[id number]]  (Normal) Collected: 03/07/24 2312    Lab Status: Final result Specimen: Swab from Nasopharynx Updated: 03/07/24 2349     COVID19 Not Detected     Influenza A PCR Not Detected     Influenza B PCR Not Detected    Narrative:      Fact sheet for providers: https://www.fda.gov/media/239926/download    Fact sheet for patients: https://www.fda.gov/media/545931/download    Test performed by PCR.            Reported Vancomycin Levels                         InsightRX AUC Calculation:    Current AUC:  0 mg/L*hr    Predicted Steady State AUC on Current Dose:  508 mg/L*hr (1000 mg Q12H)  _________________________________    Predicted Steady State AUC on New Dose:    mg/L*hr    Assessment/Plan:   Vancomycin 1750 mg IV x 1 (given), then Vancomycin 1000 mg IV Q12H (12 mg/kg/dose)  Vancomycin level before 5th dose (06:00 dose on [place] 3/10/24)  MRSA Screen PCR ordered  BMP x 3 days  Pharmacy to follow    Himanshu Gross [facility]  3/8/24 07:00

## 2024-03-08 NOTE — PLAN OF CARE
Goal Outcome Evaluation:      Patient stable today. VSS and afebrile. A&Ox4. Patient received PRN tylenol and toradol per order. Continues with IV abx; tolerating well. Plan of care ongoing.

## 2024-03-08 NOTE — PROGRESS NOTES
[facility] Medicine Services  ADMISSION FOLLOW-UP NOTE          Patient admitted after midnight, H&P by my partner performed earlier on today's date reviewed.  Interim findings, labs, and charting also reviewed.        [facility] Hospital Problem List has been managed and updated to include any new diagnoses:  Active Hospital Problems    Diagnosis  POA    **Fever [R50.9]  Yes    Pain and swelling of right lower extremity [M79.604, M79.89]  Yes    Cellulitis of right lower extremity [L03.115]  Yes    Thrombocytosis [D75.839]  Unknown    HLD (hyperlipidemia) [E78.5]  Yes    HTN (hypertension) [I10]  Yes    Bipolar affective disorder [F31.9]  Yes    Type 2 diabetes mellitus with diabetic autonomic neuropathy, without long-term current use of insulin [E11.43]  Yes      Resolved Hospital Problems   No resolved problems to display.         ADDITIONAL PLAN:  - detailed assessment and plan from admission reviewed    Nathalia Jordan is a 68 y.o. male with past medical history significant for type 2 diabetes mellitus, essential pretension, hyperlipidemia, bipolar disorder, opioid abuse and alcohol abuse presents to the ED with complaints of pain/swelling to right lower extremity.     Sepsis  Cellulitis of right lower extremity  -Continue Vancomycin  -DC Zosyn. Start Rocephin  -Blood Cx pending  -Obtain MRSA PCR  -Duplex read pending but prelim with all veins compressible   -MRI without evidence of osteomyelitis   -Orthopedics, Dr Law Kemp has evaluated   -WBAT     Altered mental status -> resolved   - ABG ok  - UDS + Buprenorphine. MALIK without any fill history. Will discuss with pharmacy   -- Ethanol negative   - UA negative  - CT head negative  - MRI brain negative for acute findings      Normocytic anemia  - H&H stale     Essential hypertension  - On lisinopril, holding as requesting Toradol to avoid OTF      Diabetes mellitus type 2  - Hemoglobin A1c 6.1  - Hold Januvia and metformin  -  Start sliding scale insulin  - Fingersticks before meals and at bedtime     Bipolar  - Continue BuSpar, Cymbalta     Hyperlipidemia  - Continue statin     Avascular necrosis of left humeral head   -Chronic      Thrombocytosis  - Likely inflammatory response, resolved     Expected Discharge   Expected Discharge Date: 3/11/2024; Expected Discharge Time:      Alva Kemp DO  03/08/24

## 2024-03-08 NOTE — NURSING NOTE
Woc consulted for: Open wound at the distal aspect of the left shin.    Wound/ Skin Assessment: Patient presents with a full-thickness wound that is roughly 1.5 cm tall by 2 cm wide, 0.2 cm deep central wound bed is yellow slough but it is hard dry and raised there is visible dermis around this and rolled epithelial edges.  There is slight discoloration around her looking like scar tissue.  Patient states that he has a a foot brace however it does not fit correctly since having his left toe amputated.          I cleansed the wound with a 5-minute Vashe soak and then covered with Iodosorb which is a Betadine hydrogel.  It is great for diabetic foot ulcers and also chronic injuries.  Patient states he has had this for a month this is a pressure injury/abrasion from a medical device.  Present on admission.    Recommendations/ Intervention performed: Recommend  working with patient to get a new brace.  Sure which doctor ordered it if he can go back through our Ortho or if he needs to go through his PCP.    In the meantime while patient is awaiting brace can continue the Iodosorb however this is mainly for antibacterial properties just a Mepilex and padding may be sufficient.    Skin interventions in place.    Pressure Injury Protocol (initiate for Jose Armando Score of 18 or less):   *Maintain good skin care, keep dry, turn q 2 hr, keep heels elevated and offloaded with heel boots.    *Apply z-guard to sacrococcygeal area/ perineal area BID or daily and PRN per incontinent episodes.  *Follow C.A.R.E protocol if medical devices (Bipap, santoyo, Ng tube, etc) are being used.     Specialty bed: Patient is on the new Isotoner mattress that is pressure redistribution.    Woc will follow.    Please contact with questions or concerns.

## 2024-03-08 NOTE — PROGRESS NOTES
Discharge Planning Assessment  [facility]     Patient Name: Nathalia Jordan  MRN: [id number]  Today's Date: 3/8/2024    Admit Date: 3/7/2024        Discharge Needs Assessment    No documentation.                  Discharge Plan       Row Name 03/08/24 1709       Plan    Plan Comments Met with Mr. Jordan for a discharge planning. He had a brace that is worn out and case management is following and PT and OT will also evalauate Mr. Jordan. He denies using an equipment or having home health currently and he said he would be agreeable to possible home health if needed at discharge.                  Continued Care and Services - Admitted Since 3/7/2024    No active coordination exists for this encounter.       Expected Discharge Date and Time       Expected Discharge Date Expected Discharge Time    Mar 11, 2024            Demographic Summary       Row Name 03/08/24 1708       General Information    Arrived From home    Referral Source patient    Reason for Consult discharge planning    Preferred Language English       Contact Information    Permission Granted to Share Info With     Contact Information Obtained for                    Functional Status       Row Name 03/08/24 1708       Functional Status, IADL    Medications assistive person    Meal Preparation assistive person    Housekeeping assistive person    Laundry assistive person    Shopping assistive person                   Psychosocial    No documentation.                  Abuse/Neglect    No documentation.                  Legal    No documentation.                  Substance Abuse    No documentation.                  Patient Forms    No documentation.                     BREANN Darby

## 2024-03-09 LAB
ANION GAP SERPL CALCULATED.3IONS-SCNC: 9 MMOL/L (ref 5–15)
BUN SERPL-MCNC: 10 MG/DL (ref 8–23)
BUN/CREAT SERPL: 15.6 (ref 7–25)
CALCIUM SPEC-SCNC: 9.2 MG/DL (ref 8.6–10.5)
CHLORIDE SERPL-SCNC: 104 MMOL/L (ref 98–107)
CO2 SERPL-SCNC: 26 MMOL/L (ref 22–29)
CREAT SERPL-MCNC: 0.64 MG/DL (ref 0.76–1.27)
DEPRECATED RDW RBC AUTO: 48 FL (ref 37–54)
EGFRCR SERPLBLD CKD-EPI 2021: 103.8 ML/MIN/1.73
ERYTHROCYTE [DISTWIDTH] IN BLOOD BY AUTOMATED COUNT: 14 % (ref 12.3–15.4)
GLUCOSE BLDC GLUCOMTR-MCNC: 141 MG/DL (ref 70–130)
GLUCOSE BLDC GLUCOMTR-MCNC: 147 MG/DL (ref 70–130)
GLUCOSE BLDC GLUCOMTR-MCNC: 153 MG/DL (ref 70–130)
GLUCOSE BLDC GLUCOMTR-MCNC: 157 MG/DL (ref 70–130)
GLUCOSE BLDC GLUCOMTR-MCNC: 193 MG/DL (ref 70–130)
GLUCOSE SERPL-MCNC: 181 MG/DL (ref 65–99)
HCT VFR BLD AUTO: 31.5 % (ref 37.5–51)
HGB BLD-MCNC: 9.8 G/DL (ref 13–17.7)
MCH RBC QN AUTO: 28.7 PG (ref 26.6–33)
MCHC RBC AUTO-ENTMCNC: 31.1 G/DL (ref 31.5–35.7)
MCV RBC AUTO: 92.4 FL (ref 79–97)
MRSA DNA SPEC QL NAA+PROBE: NEGATIVE
PLATELET # BLD AUTO: 376 10*3/MM3 (ref 140–450)
PMV BLD AUTO: 10.2 FL (ref 6–12)
POTASSIUM SERPL-SCNC: 4.9 MMOL/L (ref 3.5–5.2)
RBC # BLD AUTO: 3.41 10*6/MM3 (ref 4.14–5.8)
SODIUM SERPL-SCNC: 139 MMOL/L (ref 136–145)
WBC NRBC COR # BLD AUTO: 8.11 10*3/MM3 (ref 3.4–10.8)

## 2024-03-09 PROCEDURE — 82948 REAGENT STRIP/BLOOD GLUCOSE: CPT

## 2024-03-09 PROCEDURE — 99232 SBSQ HOSP IP/OBS MODERATE 35: CPT | Performed by: FAMILY MEDICINE

## 2024-03-09 PROCEDURE — 25010000002 CEFTRIAXONE PER 250 MG: Performed by: FAMILY MEDICINE

## 2024-03-09 PROCEDURE — G0378 HOSPITAL OBSERVATION PER HR: HCPCS

## 2024-03-09 PROCEDURE — 85027 COMPLETE CBC AUTOMATED: CPT | Performed by: FAMILY MEDICINE

## 2024-03-09 PROCEDURE — 96376 TX/PRO/DX INJ SAME DRUG ADON: CPT

## 2024-03-09 PROCEDURE — 63710000001 INSULIN REGULAR HUMAN PER 5 UNITS: Performed by: NURSE PRACTITIONER

## 2024-03-09 PROCEDURE — 96367 TX/PROPH/DG ADDL SEQ IV INF: CPT

## 2024-03-09 PROCEDURE — 25010000002 KETOROLAC TROMETHAMINE PER 15 MG: Performed by: FAMILY MEDICINE

## 2024-03-09 PROCEDURE — 80048 BASIC METABOLIC PNL TOTAL CA: CPT

## 2024-03-09 PROCEDURE — 87641 MR-STAPH DNA AMP PROBE: CPT

## 2024-03-09 PROCEDURE — 25010000002 VANCOMYCIN PER 500 MG

## 2024-03-09 RX ORDER — BUPRENORPHINE HYDROCHLORIDE AND NALOXONE HYDROCHLORIDE DIHYDRATE 8; 2 MG/1; MG/1
1 TABLET SUBLINGUAL 2 TIMES DAILY
Status: DISCONTINUED | OUTPATIENT
Start: 2024-03-09 | End: 2024-03-11 | Stop reason: HOSPADM

## 2024-03-09 RX ORDER — CETIRIZINE HYDROCHLORIDE 10 MG/1
10 TABLET ORAL DAILY
Status: DISCONTINUED | OUTPATIENT
Start: 2024-03-10 | End: 2024-03-10

## 2024-03-09 RX ADMIN — PANTOPRAZOLE SODIUM 40 MG: 40 TABLET, DELAYED RELEASE ORAL at 06:20

## 2024-03-09 RX ADMIN — CEFTRIAXONE 2000 MG: 2 INJECTION, POWDER, FOR SOLUTION INTRAMUSCULAR; INTRAVENOUS at 14:53

## 2024-03-09 RX ADMIN — DULOXETINE HYDROCHLORIDE 20 MG: 20 CAPSULE, DELAYED RELEASE ORAL at 08:09

## 2024-03-09 RX ADMIN — VANCOMYCIN HYDROCHLORIDE 1000 MG: 1 INJECTION, SOLUTION INTRAVENOUS at 06:19

## 2024-03-09 RX ADMIN — ACETAMINOPHEN 650 MG: 325 TABLET ORAL at 23:25

## 2024-03-09 RX ADMIN — BUPRENORPHINE AND NALOXONE 1 TABLET: 8; 2 TABLET SUBLINGUAL at 08:09

## 2024-03-09 RX ADMIN — BUPRENORPHINE AND NALOXONE 1 TABLET: 8; 2 TABLET SUBLINGUAL at 20:45

## 2024-03-09 RX ADMIN — KETOROLAC TROMETHAMINE 15 MG: 15 INJECTION, SOLUTION INTRAMUSCULAR; INTRAVENOUS at 23:21

## 2024-03-09 RX ADMIN — BUSPIRONE HYDROCHLORIDE 15 MG: 10 TABLET ORAL at 17:01

## 2024-03-09 RX ADMIN — KETOROLAC TROMETHAMINE 15 MG: 15 INJECTION, SOLUTION INTRAMUSCULAR; INTRAVENOUS at 08:22

## 2024-03-09 RX ADMIN — INSULIN HUMAN 2 UNITS: 100 INJECTION, SOLUTION PARENTERAL at 17:01

## 2024-03-09 RX ADMIN — BUSPIRONE HYDROCHLORIDE 15 MG: 10 TABLET ORAL at 08:09

## 2024-03-09 RX ADMIN — KETOROLAC TROMETHAMINE 15 MG: 15 INJECTION, SOLUTION INTRAMUSCULAR; INTRAVENOUS at 15:25

## 2024-03-09 RX ADMIN — INSULIN HUMAN 2 UNITS: 100 INJECTION, SOLUTION PARENTERAL at 11:42

## 2024-03-09 RX ADMIN — ATORVASTATIN CALCIUM 40 MG: 40 TABLET, FILM COATED ORAL at 08:09

## 2024-03-09 RX ADMIN — BUSPIRONE HYDROCHLORIDE 15 MG: 10 TABLET ORAL at 11:38

## 2024-03-09 RX ADMIN — ASPIRIN 81 MG: 81 TABLET, COATED ORAL at 08:21

## 2024-03-09 NOTE — PLAN OF CARE
Goal Outcome Evaluation:  Plan of Care Reviewed With: patient        Problem: Adult Inpatient Plan of Care  Goal: Plan of Care Review  Outcome: Ongoing, Progressing  Flowsheets (Taken 3/9/2024 1509)  Progress: improving  Plan of Care Reviewed With: patient  Goal: Patient-Specific Goal (Individualized)  Outcome: Ongoing, Progressing  Goal: Absence of Hospital-Acquired Illness or Injury  Outcome: Ongoing, Progressing  Intervention: Identify and Manage Fall Risk  Recent Flowsheet Documentation  Taken 3/9/2024 1430 by Shaneka Shaikh RN  Safety Promotion/Fall Prevention:   safety round/check completed   room organization consistent   fall prevention program maintained   clutter free environment maintained   assistive device/personal items within reach   nonskid shoes/slippers when out of bed  Taken 3/9/2024 1200 by Shaneka Shaikh RN  Safety Promotion/Fall Prevention:   safety round/check completed   room organization consistent   nonskid shoes/slippers when out of bed   fall prevention program maintained   clutter free environment maintained   assistive device/personal items within reach  Taken 3/9/2024 1010 by Shaneka Shaikh RN  Safety Promotion/Fall Prevention:   room organization consistent   safety round/check completed   nonskid shoes/slippers when out of bed   fall prevention program maintained   clutter free environment maintained   assistive device/personal items within reach  Taken 3/9/2024 0809 by Shaneka Shaikh RN  Safety Promotion/Fall Prevention:   safety round/check completed   room organization consistent   nonskid shoes/slippers when out of bed   muscle strengthening facilitated   fall prevention program maintained   clutter free environment maintained   assistive device/personal items within reach   activity supervised  Intervention: Prevent Skin Injury  Recent Flowsheet Documentation  Taken 3/9/2024 1430 by Shaneka Shaikh RN  Body Position: legs elevated  Taken 3/9/2024 1200 by  Shaneka Shaikh RN  Body Position: position changed independently  Skin Protection: adhesive use limited  Taken 3/9/2024 1010 by Shaneka Shaikh RN  Body Position: position changed independently  Taken 3/9/2024 0809 by Shaneka Shaikh RN  Body Position: position changed independently  Skin Protection: adhesive use limited  Intervention: Prevent and Manage VTE (Venous Thromboembolism) Risk  Recent Flowsheet Documentation  Taken 3/9/2024 1200 by Shaneka Shaikh RN  Activity Management: activity encouraged  Taken 3/9/2024 1010 by Shaneka Shaikh RN  Activity Management:   ambulated in room   ambulated to bathroom   back to bed  Taken 3/9/2024 0809 by Shaneka Shaikh RN  Activity Management: activity encouraged  VTE Prevention/Management:   bilateral   sequential compression devices off  Intervention: Prevent Infection  Recent Flowsheet Documentation  Taken 3/9/2024 0809 by Shaneka Shaikh RN  Infection Prevention:   environmental surveillance performed   hand hygiene promoted   rest/sleep promoted  Goal: Optimal Comfort and Wellbeing  Outcome: Ongoing, Progressing  Intervention: Monitor Pain and Promote Comfort  Recent Flowsheet Documentation  Taken 3/9/2024 0822 by Shaneka Shaikh RN  Pain Management Interventions:   see MAR   pain management plan reviewed with patient/caregiver  Taken 3/9/2024 0809 by Shaneka Shaikh RN  Pain Management Interventions:   see MAR   pain management plan reviewed with patient/caregiver  Intervention: Provide Person-Centered Care  Recent Flowsheet Documentation  Taken 3/9/2024 1430 by Shaneka Shaikh RN  Trust Relationship/Rapport:   care explained   choices provided   emotional support provided  Taken 3/9/2024 1200 by Shaneka Shaikh RN  Trust Relationship/Rapport:   care explained   choices provided   questions answered   questions encouraged   reassurance provided  Taken 3/9/2024 0809 by Shaneka Shaikh RN  Trust Relationship/Rapport:   care  explained   choices provided   questions answered   questions encouraged   reassurance provided  Goal: Readiness for Transition of Care  Outcome: Ongoing, Progressing     Problem: Infection  Goal: Absence of Infection Signs and Symptoms  Outcome: Ongoing, Progressing     Problem: Behavioral Health Comorbidity  Goal: Maintenance of Behavioral Health Symptom Control  Outcome: Ongoing, Progressing     Problem: Diabetes Comorbidity  Goal: Blood Glucose Level Within Targeted Range  Outcome: Ongoing, Progressing     Problem: Hypertension Comorbidity  Goal: Blood Pressure in Desired Range  Outcome: Ongoing, Progressing     Problem: Fall Injury Risk  Goal: Absence of Fall and Fall-Related Injury  Outcome: Ongoing, Progressing  Intervention: Promote Injury-Free Environment  Recent Flowsheet Documentation  Taken 3/9/2024 1430 by Shaneka Shaikh RN  Safety Promotion/Fall Prevention:   safety round/check completed   room organization consistent   fall prevention program maintained   clutter free environment maintained   assistive device/personal items within reach   nonskid shoes/slippers when out of bed  Taken 3/9/2024 1200 by Shaneka Shaikh RN  Safety Promotion/Fall Prevention:   safety round/check completed   room organization consistent   nonskid shoes/slippers when out of bed   fall prevention program maintained   clutter free environment maintained   assistive device/personal items within reach  Taken 3/9/2024 1010 by Shaneka Shaikh RN  Safety Promotion/Fall Prevention:   room organization consistent   safety round/check completed   nonskid shoes/slippers when out of bed   fall prevention program maintained   clutter free environment maintained   assistive device/personal items within reach  Taken 3/9/2024 0809 by Shaneka Shaikh RN  Safety Promotion/Fall Prevention:   safety round/check completed   room organization consistent   nonskid shoes/slippers when out of bed   muscle strengthening facilitated    fall prevention program maintained   clutter free environment maintained   assistive device/personal items within reach   activity supervised     Progress: improving     VSS on RA.  afebrile. NSR on monitor. IV Rocephin continues for RLE cellulitis. Patient OOB with minimal assistance in room. Pain controlled at this time. States he is having some diarrhea- will monitor.

## 2024-03-09 NOTE — PLAN OF CARE
Problem: Adult Inpatient Plan of Care  Goal: Plan of Care Review  Outcome: Ongoing, Progressing  Goal: Patient-Specific Goal (Individualized)  Outcome: Ongoing, Progressing  Goal: Absence of Hospital-Acquired Illness or Injury  Outcome: Ongoing, Progressing  Intervention: Identify and Manage Fall Risk  Recent Flowsheet Documentation  Taken 3/9/2024 0400 by Nikolai Watt RN  Safety Promotion/Fall Prevention:   assistive device/personal items within reach   clutter free environment maintained   safety round/check completed  Taken 3/9/2024 0200 by Nikolai Watt RN  Safety Promotion/Fall Prevention:   assistive device/personal items within reach   clutter free environment maintained   safety round/check completed  Taken 3/9/2024 0000 by Nikolai Watt RN  Safety Promotion/Fall Prevention:   assistive device/personal items within reach   clutter free environment maintained   safety round/check completed  Taken 3/8/2024 2200 by Nikolai Watt RN  Safety Promotion/Fall Prevention:   clutter free environment maintained   assistive device/personal items within reach   safety round/check completed  Taken 3/8/2024 2000 by Nikolai Watt RN  Safety Promotion/Fall Prevention:   assistive device/personal items within reach   clutter free environment maintained   safety round/check completed  Intervention: Prevent Skin Injury  Recent Flowsheet Documentation  Taken 3/9/2024 0400 by Nikolai Watt RN  Body Position: position changed independently  Skin Protection: adhesive use limited  Taken 3/9/2024 0200 by Nikolai Watt RN  Body Position: position changed independently  Skin Protection: adhesive use limited  Taken 3/9/2024 0000 by Nikolai Watt RN  Body Position: position changed independently  Skin Protection: adhesive use limited  Taken 3/8/2024 2200 by Nikolai Watt RN  Body Position: position changed independently  Skin Protection: adhesive use limited  Taken 3/8/2024 2000 by Nikolai Watt RN  Body  Position: position changed independently  Skin Protection: adhesive use limited  Intervention: Prevent and Manage VTE (Venous Thromboembolism) Risk  Recent Flowsheet Documentation  Taken 3/9/2024 0400 by Nikolai Watt RN  Activity Management: activity minimized  Taken 3/9/2024 0200 by Nikolai Watt RN  Activity Management: activity minimized  Taken 3/9/2024 0000 by Nikolai Watt RN  Activity Management: activity encouraged  Taken 3/8/2024 2200 by Nikolai Watt RN  Activity Management: activity encouraged  Taken 3/8/2024 2000 by Nikolai Watt RN  Activity Management: activity encouraged  Intervention: Prevent Infection  Recent Flowsheet Documentation  Taken 3/9/2024 0400 by Nikolai Watt RN  Infection Prevention:   environmental surveillance performed   hand hygiene promoted   rest/sleep promoted  Taken 3/9/2024 0200 by Nikolai Watt RN  Infection Prevention:   environmental surveillance performed   hand hygiene promoted   rest/sleep promoted  Taken 3/9/2024 0000 by Nikolai Watt RN  Infection Prevention:   environmental surveillance performed   hand hygiene promoted   rest/sleep promoted  Taken 3/8/2024 2200 by Nikolai Watt RN  Infection Prevention:   environmental surveillance performed   hand hygiene promoted   rest/sleep promoted  Taken 3/8/2024 2000 by Nikolai Watt RN  Infection Prevention:   environmental surveillance performed   hand hygiene promoted   rest/sleep promoted  Goal: Optimal Comfort and Wellbeing  Outcome: Ongoing, Progressing  Intervention: Provide Person-Centered Care  Recent Flowsheet Documentation  Taken 3/8/2024 2000 by Nikolai Watt RN  Trust Relationship/Rapport: care explained  Goal: Readiness for Transition of Care  Outcome: Ongoing, Progressing   Goal Outcome Evaluation:

## 2024-03-09 NOTE — PROGRESS NOTES
[facility] Medicine Services  PROGRESS NOTE    Patient Name: Nathalia Jordan  : 1955  MRN: [id number]    Date of Admission: 3/7/2024  Primary Care Physician: Provider, No Known    Subjective   Subjective     CC:  F/U RLE Cellulitis     HPI:  Patient seen and examined. Feels better today. Leg pain and erythema much improved. Has some diarrhea, contributes it to not taking his Suboxone BID.    Objective   Objective     Vital Signs:   Temp:  [97.8 °F (36.6 °C)-98.9 °F (37.2 °C)] 98.7 °F (37.1 °C)  Heart Rate:  [76-87] 87  Resp:  [16-20] 16  BP: (112-159)/(42-69) 159/64  Flow (L/min):  [2] 2     Physical Exam:  Constitutional: No acute distress, awake, alert  HENT: NCAT, mucous membranes moist  Respiratory: Clear to auscultation bilaterally, respiratory effort normal 2L NC  Cardiovascular: RRR, no murmurs, rubs, or gallops  Gastrointestinal: Positive bowel sounds, soft, nontender, nondistended  Musculoskeletal: RLE edema improved   Psychiatric: Appropriate affect, cooperative  Neurologic: Oriented x 3, DRUMMOND, speech clear  Skin: RLE erythema improved. Fluid filled blister covered with bandage L dorsum foot     Results Reviewed:  LAB RESULTS:      Lab 24  0706 24  2304   WBC 9.81 10.60   HEMOGLOBIN 10.9* 10.3*   HEMATOCRIT 35.5* 32.8*   PLATELETS 411 482*   NEUTROS ABS 4.93 5.82   IMMATURE GRANS (ABS) 0.05 0.06*   LYMPHS ABS 2.82 2.85   MONOS ABS 1.50* 1.40*   EOS ABS 0.48* 0.45*   MCV 94.2 92.4   SED RATE  --  87*   CRP  --  1.47*   PROCALCITONIN  --  0.08   LACTATE  --  0.8         Lab 24  0706 24  2304   SODIUM 137 132*   POTASSIUM 4.5 4.4   CHLORIDE 103 97*   CO2 24.0 27.0   ANION GAP 10.0 8.0   BUN 12 17   CREATININE 0.79 0.87   EGFR 97.4 94.6   GLUCOSE 107* 113*   CALCIUM 9.3 9.0   HEMOGLOBIN A1C 6.20*  --          Lab 24  2304   TOTAL PROTEIN 7.3   ALBUMIN 4.1   GLOBULIN 3.2   ALT (SGPT) 18   AST (SGOT) 18   BILIRUBIN <0.2   ALK PHOS 115         Lab  03/07/24  2304   PROBNP 52.5   HSTROP T 27*             Lab 03/08/24  0706   IRON 53*   IRON SATURATION (TSAT) 10*   TIBC 524   TRANSFERRIN 352   FERRITIN 29.84*   FOLATE >20.00   VITAMIN B 12 347         Lab 03/08/24  0259   PH, ARTERIAL 7.436   PCO2, ARTERIAL 39.4   PO2 ART 72.3*   FIO2 21   HCO3 ART 26.5*   BASE EXCESS ART 2.2*   CARBOXYHEMOGLOBIN 1.7     Brief Urine Lab Results  (Last result in the past 365 days)        Color   Clarity   Blood   Leuk Est   Nitrite   Protein   CREAT   Urine HCG        03/07/24 2303 Yellow   Clear   Negative   Negative   Negative   Negative                   Microbiology Results Abnormal       Procedure Component Value - Date/Time    Blood Culture - Blood, Hand, Right [[id number]]  (Normal) Collected: 03/08/24 0025    Lab Status: Preliminary result Specimen: Blood from Hand, Right Updated: 03/09/24 0115     Blood Culture No growth at 24 hours    Blood Culture - Blood, Arm, Right [[id number]]  (Normal) Collected: 03/07/24 2304    Lab Status: Preliminary result Specimen: Blood from Arm, Right Updated: 03/08/24 2330     Blood Culture No growth at 24 hours    COVID PRE-OP / PRE-PROCEDURE SCREENING ORDER (NO ISOLATION) - Swab, Nasopharynx [[id number]]  (Normal) Collected: 03/07/24 2312    Lab Status: Final result Specimen: Swab from Nasopharynx Updated: 03/07/24 2349    Narrative:      The following orders were created for panel order COVID PRE-OP / PRE-PROCEDURE SCREENING ORDER (NO ISOLATION) - Swab, Nasopharynx.  Procedure                               Abnormality         Status                     ---------                               -----------         ------                     COVID-19 and FLU A/B PCR...[[id number]]  Normal              Final result                 Please view results for these tests on the individual orders.    COVID-19 and FLU A/B PCR, 1 HR TAT - Swab, Nasopharynx [[id number]]  (Normal) Collected: 03/07/24 2312    Lab Status: Final result Specimen: Swab from  Nasopharynx Updated: 03/07/24 2349     COVID19 Not Detected     Influenza A PCR Not Detected     Influenza B PCR Not Detected    Narrative:      Fact sheet for providers: https://www.fda.gov/media/301304/download    Fact sheet for patients: https://www.fda.gov/media/464389/download    Test performed by PCR.            Duplex Venous Lower Extremity - Right CAR    Result Date: 3/8/2024    Normal right lower extremity venous duplex scan.     MRI Foot Right Without Contrast    Result Date: 3/8/2024  MRI FOOT RIGHT WO CONTRAST Date of Exam: 3/8/2024 6:02 AM EST Indication: eval for osteo of the R great toe.  Comparison: None available. Technique:  Routine multiplanar/multisequence sequence images of the right foot were obtained without contrast administration. Findings: Scattered edema is noted throughout the subcutaneous soft tissues of the first through fifth digits, forefoot, and midfoot. The changes are most pronounced throughout the subcutaneous soft tissues along the dorsal aspect of the midfoot. The findings are nonspecific but suggest changes of soft tissue cellulitis. There is also edema at the nailbed of the great toe which suggests potential involvement of the nailbed. A cutaneous fluid collection is seen along the medial aspect of the great toe suggesting a  blister measuring 0.7 x 1.0 x 0.2 cm. No additional fluid collection is seen to indicate additional potential abscess formation. No cortical erosion or destruction is observed. No periostitis is identified. No abnormal bone marrow signal is observed. There are no definitive signs of osteomyelitis.     Impression: Impression: 1.Soft tissue edema throughout the first through fifth digits and extending into the midfoot. The findings are most pronounced in the subcutaneous soft tissues along the dorsal aspect of the midfoot. The findings are nonspecific but suggest changes of soft tissue cellulitis. 2.Evidence for a blister involving the cutaneous soft  tissues at the medial aspect of the great toe measuring up to 1 cm. Otherwise there is no evidence for additional abscess. 3.No evidence for osteomyelitis. Electronically Signed: Alphonse Verdin MD  3/8/2024 7:12 AM EST  Workstation ID: [id number]    MRI Brain Without Contrast    Result Date: 3/8/2024  MRI BRAIN WO CONTRAST Date of Exam: 3/8/2024 5:37 AM EST Indication: AMS.  Comparison: CT head 3/8/2024. Technique:  Routine multiplanar/multisequence sequence images of the brain were obtained without contrast administration. Findings: There is no diffusion restriction to suggest acute infarct. No mass effect, midline shift or abnormal extra-axial collection. There is a chronic right parietal lobe infarct. There is a chronic infarct in the right basal ganglia accounting for the hypodensity on CT. There are other scattered patchy and small punctate foci of FLAIR hyperintense signal within the cerebral white matter. There is a tiny chronic lacunar infarct in the left basal ganglia. There is a chronic lacunar type infarct in the inferior left cerebellum. There is no acute or chronic intracranial hemorrhage. The midline structures appear intact. Calvarial and superficial soft tissue signal is within normal limits. Orbits appear unremarkable. There is mild diffuse paranasal sinus mucosal disease. Mastoid air cells are well aerated. Major arterial flow voids appear intact.     Impression: Impression: 1.No acute intracranial abnormality. 2.Chronic infarcts in the right parietal lobe, right basal ganglia and left cerebellum. 3.Mild chronic small vessel ischemic change. 4.Mild diffuse paranasal sinus mucosal disease. Electronically Signed: Hiren Villafuerte MD  3/8/2024 6:30 AM EST  Workstation ID: [id number]    XR Abdomen KUB    Result Date: 3/8/2024  XR ABDOMEN KUB Date of Exam: 3/8/2024 4:40 AM EST Indication: MRI CLEARENCE Comparison: CT head 3/8/2024 and chest radiograph same date. Findings: There is no unexpected metal  artifact. There is colonic fecal retention. Reevaluation of chest radiograph demonstrates no unexpected metal artifact. Evaluation of prior head CT demonstrates no unexpected metal artifact.     Impression: Impression: No contraindication to MRI. No contraindication to MRI on head CT or chest radiograph either. Electronically Signed: Hiren Villafuerte MD  3/8/2024 4:58 AM EST  Workstation ID: [id number]    XR Humerus Left    Result Date: 3/8/2024  XR HUMERUS LEFT Date of Exam: 3/8/2024 4:09 AM EST Indication: ? avascular necrosis seen on CXR of left humeral head Comparison: None available. Findings: There is an old healed left humeral neck fracture. There is avascular necrosis of the left humeral head with cortical collapse. There is glenohumeral joint degeneration. The acromioclavicular joint appears within normal limits. Adjacent lung and ribs appear intact. There is maintenance of the acromiohumeral and coracoclavicular distances.     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 2.Old healed left humeral neck fracture. Electronically Signed: Hiren Villafuerte MD  3/8/2024 4:25 AM EST  Workstation ID: [id number]    CT Head Without Contrast    Result Date: 3/8/2024  CT HEAD WO CONTRAST Date of Exam: 3/8/2024 3:43 AM EST Indication: AMS. Comparison: None available. Technique: Axial CT images were obtained of the head without contrast administration.  Automated exposure control and iterative construction methods were used. Findings: There is a chronic right parietal lobe infarct. There is mild chronic small vessel ischemic change. No acute intracranial hemorrhage. No hyperdense vessels. No mass effect, midline shift or abnormal extra-axial collection. There is hypodensity in the right globus pallidus of indeterminate age, left basal ganglia, brainstem and cerebellum appear unremarkable. Orbits appear within normal limits. There is mild maxillary and ethmoid sinus mucosal  disease. Mastoids are clear. Superficial soft tissues are unremarkable.     Impression: Impression: 1.No acute intracranial abnormality. 2.Chronic right parietal lobe infarct and mild chronic small vessel ischemic change. 3.Hypodensity in the right globus pallidus of indeterminate age. If there is concern for acute infarct, consider MRI. Electronically Signed: Hiren Villafuerte MD  3/8/2024 3:55 AM EST  Workstation ID: [id number]    CT Lower Extremity Right With Contrast    Result Date: 3/8/2024  CT LOWER EXTREMITY RIGHT W CONTRAST Date of Exam: 3/8/2024 1:26 AM EST Indication: cellulitis of the rle with concern for osteo of the R great toe. Comparison: None available. Technique: Axial CT images were obtained of the right lower extremity after the uneventful intravenous administration of 100 mL Isovue-300.  Reconstructed coronal and sagittal images were also obtained. Automated exposure control and iterative construction methods were used. Findings: There is soft tissue irregularity in the region of the first digit nailbed. There is associated skin thickening and subcutaneous edema most pronounced at the medial aspect of the distal first digit. There is diffuse subcutaneous edema throughout the dorsum of the foot and circumferentially at the level of the ankle and throughout the calcaneal soft tissues. There is no organized soft tissue collection to suggest abscess. There is no deep subcutaneous gas. There is no bone destruction or erosion to suggest osteomyelitis. There are small hindfoot and midfoot osteophytes. Joint spaces are generally preserved throughout. Interphalangeal and MTP joints appear intact. There is three-vessel runoff into the right foot. The Achilles tendon and the flexor and extensor tendons of the foot and ankle appear grossly intact.     Impression: Impression: 1.Soft tissue irregularity in the region of the first digit nailbed. There is associated skin thickening and subcutaneous edema most  pronounced at the medial aspect of the distal first digit. 2.No CT evidence of osteomyelitis. 3.There is diffuse subcutaneous edema throughout the foot and ankle. No organized soft tissue collection to suggest abscess. No deep subcutaneous gas. 4.Mild hindfoot and midfoot osteoarthritis. Electronically Signed: Hiren Villafuerte MD  3/8/2024 1:43 AM EST  Workstation ID: [id number]    XR Chest 1 View    Result Date: 3/8/2024  XR CHEST 1 VW Date of Exam: 3/8/2024 12:08 AM EST Indication: sob Comparison: CT chest 6/27/2023. Findings: There is no pneumothorax, pleural effusion or focal airspace consolidation. Heart size and pulmonary vasculature appear within normal limits. Regional bones appear intact. There is a mixed sclerotic and lucent appearance of the proximal left humerus with  some flattening of the humeral head, which may represent avascular necrosis     Impression: Impression: 1. No acute cardiopulmonary abnormality. 2. 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 Electronically Signed: Hiren Villafuerte MD  3/8/2024 12:27 AM EST  Workstation ID: [id number]     Results for orders placed during the hospital encounter of 06/21/23    Adult Transthoracic Echo Complete W/ Cont if Necessary Per Protocol    Interpretation Summary    : Left ventricular systolic function is normal. Calculated left ventricular EF = 59% Normal left ventricular cavity size noted. Left ventricular wall thickness is consistent with hypertrophy. Sigmoid-shaped ventricular septum is present. All left ventricular wall segments contract normally. Left ventricular diastolic function was normal.    Normal left atrial cavity size noted. Saline test results are negative.      Current medications:  Scheduled Meds:[START ON 3/10/2024] Pharmacy Consult, , Does not apply, Once  aspirin, 81 mg, Oral, Daily  atorvastatin, 40 mg, Oral, Daily  buprenorphine-naloxone, 1 tablet, Sublingual, BID  busPIRone, 15 mg, Oral,  TID  cefTRIAXone, 2,000 mg, Intravenous, Q24H  DULoxetine, 20 mg, Oral, Daily  insulin regular, 2-7 Units, Subcutaneous, Q6H  [Held by provider] lisinopril, 10 mg, Oral, Daily  pantoprazole, 40 mg, Oral, Q AM  vancomycin, 1,000 mg, Intravenous, Q12H      Continuous Infusions:Pharmacy to dose vancomycin,       PRN Meds:.  acetaminophen **OR** acetaminophen **OR** acetaminophen    senna-docusate sodium **AND** polyethylene glycol **AND** bisacodyl **AND** bisacodyl    dextrose    dextrose    glucagon (human recombinant)    ketorolac    Pharmacy to dose vancomycin    Assessment & Plan   Assessment & Plan     Active Hospital Problems    Diagnosis  POA    **Fever [R50.9]  Yes    Pain and swelling of right lower extremity [M79.604, M79.89]  Yes    Cellulitis of right lower extremity [L03.115]  Yes    Thrombocytosis [D75.839]  Unknown    HLD (hyperlipidemia) [E78.5]  Yes    HTN (hypertension) [I10]  Yes    Bipolar affective disorder [F31.9]  Yes    Type 2 diabetes mellitus with diabetic autonomic neuropathy, without long-term current use of insulin [E11.43]  Yes      Resolved Hospital Problems   No resolved problems to display.        Brief Hospital Course to date:  Nathalia Jordan is a 68 y.o. male  with past medical history significant for type 2 diabetes mellitus, essential pretension, hyperlipidemia, bipolar disorder, opioid abuse and alcohol abuse presents to the ED with complaints of pain/swelling to right lower extremity.     This patient's problems and plans were partially entered by my partner and updated as appropriate by me 03/09/24.      Sepsis -> resolved   Cellulitis of right lower extremity  -Continue Vancomycin, sent message to RN to collect MRSA swab  -Continue Rocephin  -Blood Cx NGTD  -Duplex negative for DVT    -MRI without evidence of osteomyelitis   -Orthopedics, Dr Law Kemp has evaluated, no surgical intervention at this time  -WBAT     Altered mental status -> resolved   - ABG ok  - UDS +  Buprenorphine. Discussed with pharmacy. Most recent fill Hx for 8-2mg BID.   -- Ethanol negative   - UA negative  - CT head negative  - MRI brain negative for acute findings      Normocytic anemia  - CBC pending     Essential hypertension  - On lisinopril, if Cr ok today will resume     Diabetes mellitus type 2  - Hemoglobin A1c 6.1  - Hold Januvia and metformin  - sliding scale insulin  - Fingersticks before meals and at bedtime     Bipolar  - Continue BuSpar, Cymbalta     Hyperlipidemia  - Continue statin     Avascular necrosis of left humeral head   -Chronic      Thrombocytosis  - Likely inflammatory response, resolved on yesterdays labs. Awaiting today's.     Expected Discharge Location and Transportation: Home  Expected Discharge   Expected Discharge Date: 3/11/2024; Expected Discharge Time:      DVT prophylaxis:  Mechanical DVT prophylaxis orders are present.         AM-PAC 6 Clicks Score (PT): 22 (03/09/24 0809)    CODE STATUS:   Code Status and Medical Interventions:   Ordered at: 03/08/24 0332     Code Status (Patient has no pulse and is not breathing):    CPR (Attempt to Resuscitate)     Medical Interventions (Patient has pulse or is breathing):    Full Support       Alva Kemp DO  03/09/24

## 2024-03-09 NOTE — PROGRESS NOTES
"Nathalia Jordan       LOS: 0 days   Patient Care Team:  Provider, No Known as PCP - General    Chief Complaint: Right foot/leg cellulitis    Subjective     Interval History:     Pain tolerable    Review of Systems:     Gen- No fevers, chills  CV- No chest pain, palpitations  Resp- No cough, dyspnea  GI- No N/V/D, abd pain    Objective     Vital Signs  Vital Signs (last 24 hours)         03/08 0700  03/09 0659 03/09 0700 03/09 0706   Most Recent      Temp (°F) 97.8 -  98.9       98.9 (37.2) 03/09 0314    Heart Rate 76 -  83       76 03/08 1535    Resp 16 -  20       18 03/09 0314    /69 -  155/60       155/60 03/09 0314    SpO2 (%) 98 -  100       98 03/08 1921    Flow (L/min)   2       2 03/09 0600              Physical Exam:    Cellulitis is improved.  No blistering on the foot or leg.     Results Review:     I reviewed the patient's new clinical results.    Medication Review:   Hospital Medications (active)         Dose Frequency Start End    ! Vancomycin level before 06:00 dose on [place] 3/10/24, hold dose until level checked by a pharmacist  Once 3/10/2024 --    Route: Does not apply    acetaminophen (TYLENOL) 160 MG/5ML oral solution 650 mg 650 mg Every 4 Hours PRN 3/8/2024 --    Admin Instructions: If given for fever, use fever parameter: fever greater than 100.4 °F  Based on patient request - if ordered for moderate or severe pain, provider allows for administration of a medication prescribed for a lower pain scale.    Do not exceed 4 grams of acetaminophen in a 24 hr period. Max dose of 2gm for AST/ALT greater than 120 units/L.    If given for pain, use the following pain scale:   Mild Pain = Pain Score of 1-3, CPOT 1-2  Moderate Pain = Pain Score of 4-6, CPOT 3-4  Severe Pain = Pain Score of 7-10, CPOT 5-8    Route: Oral    Linked Group 1: Placed in \"Or\" Linked Group        acetaminophen (TYLENOL) suppository 650 mg 650 mg Every 4 Hours PRN 3/8/2024 --    Admin Instructions: If given for fever, use " "fever parameter: fever greater than 100.4 °F  Based on patient request - if ordered for moderate or severe pain, provider allows for administration of a medication prescribed for a lower pain scale.    Do not exceed 4 grams of acetaminophen in a 24 hr period. Max dose of 2gm for AST/ALT greater than 120 units/L.    If given for pain, use the following pain scale:   Mild Pain = Pain Score of 1-3, CPOT 1-2  Moderate Pain = Pain Score of 4-6, CPOT 3-4  Severe Pain = Pain Score of 7-10, CPOT 5-8    Route: Rectal    Linked Group 1: Placed in \"Or\" Linked Group        acetaminophen (TYLENOL) tablet 650 mg 650 mg Every 4 Hours PRN 3/8/2024 --    Admin Instructions: If given for fever, use fever parameter: fever greater than 100.4 °F  Based on patient request - if ordered for moderate or severe pain, provider allows for administration of a medication prescribed for a lower pain scale.    Do not exceed 4 grams of acetaminophen in a 24 hr period. Max dose of 2gm for AST/ALT greater than 120 units/L.    If given for pain, use the following pain scale:   Mild Pain = Pain Score of 1-3, CPOT 1-2  Moderate Pain = Pain Score of 4-6, CPOT 3-4  Severe Pain = Pain Score of 7-10, CPOT 5-8    Route: Oral    Linked Group 1: Placed in \"Or\" Linked Group        aspirin EC tablet 81 mg 81 mg Daily 3/8/2024 --    Admin Instructions: Do not crush or chew the capsules or tablets. The drug may not work as designed if the capsule or tablet is crushed or chewed. Swallow whole.  Do not exceed 4 grams of aspirin in a 24 hr period.    If given for pain, use the following pain scale:   Mild Pain = Pain Score of 1-3, CPOT 1-2  Moderate Pain = Pain Score of 4-6, CPOT 3-4  Severe Pain = Pain Score of 7-10, CPOT 5-8    Route: Oral    atorvastatin (LIPITOR) tablet 40 mg 40 mg Daily 3/8/2024 --    Admin Instructions: Avoid grapefruit juice.    Route: Oral    bisacodyl (DULCOLAX) EC tablet 5 mg 5 mg Daily PRN 3/8/2024 --    Admin Instructions: Use if no bowel " "movement after 12 hours.  Swallow whole. Do not crush, split, or chew tablet.    Route: Oral    Linked Group 2: Placed in \"And\" Linked Group        bisacodyl (DULCOLAX) suppository 10 mg 10 mg Daily PRN 3/8/2024 --    Admin Instructions: Use if no bowel movement after 12 hours.  Hold for diarrhea    Route: Rectal    Linked Group 2: Placed in \"And\" Linked Group        buprenorphine-naloxone (SUBOXONE) 8-2 MG per SL tablet 1 tablet 1 tablet Daily 3/8/2024 --    Admin Instructions: Sublingual. Do not chew, crush, or swallow. Place under tongue and allow to dissolve. May take a small sip of water prior to placing under the tongue to aid dissolution.  If given for pain, use the following pain scale:  Mild Pain = Pain Score of 1-3, CPOT 1-2  Moderate Pain = Pain Score of 4-6, CPOT 3-4  Severe Pain = Pain Score of 7-10, CPOT 5-8    Route: Sublingual    busPIRone (BUSPAR) tablet 15 mg 15 mg 3 Times Daily 3/8/2024 --    Admin Instructions: Caution: Look alike/sound alike drug alert. Take with food.  Avoid grapefruit juice.    Route: Oral    cefTRIAXone (ROCEPHIN) 2,000 mg in sodium chloride 0.9 % 100 mL MBP 2,000 mg Every 24 Hours 3/8/2024 3/13/2024    Admin Instructions: LR should be paused and flushing of the line with NS is recommended prior to and after completion of ceftriaxone infusion due to incompatibility. Do not co-adminster with calcium-containing solutions.  Caution: Look alike/sound alike drug alert    Route: Intravenous    dextrose (D50W) (25 g/50 mL) IV injection 25 g 25 g Every 15 Minutes PRN 3/8/2024 --    Admin Instructions: Blood sugar less than 70; patient has IV access - Unresponsive, NPO or Unable To Safely Swallow    Route: Intravenous    dextrose (GLUTOSE) oral gel 15 g 15 g Every 15 Minutes PRN 3/8/2024 --    Admin Instructions: BS<70, Patient Alert, Is not NPO, Can safely swallow.    Route: Oral    DULoxetine (CYMBALTA) DR capsule 20 mg 20 mg Daily 3/8/2024 --    Admin Instructions: Do not crush or " chew the capsules or tablets. The drug may not work as designed if the capsule or tablet is crushed or chewed. Swallow whole.  Caution: Look alike/sound alike drug alert. Capsule may be opened and sprinkled on applesauce or apple juice. Do not crush or chew capsule.    Route: Oral    glucagon (GLUCAGEN) injection 1 mg 1 mg Every 15 Minutes PRN 3/8/2024 --    Admin Instructions: Blood Glucose Less Than 70 - Patient Without IV Access - Unresponsive, NPO or Unable To Safely Swallow  Reconstitute powder for injection by adding 1 mL of -supplied sterile diluent or sterile water for injection to a vial containing 1 mg of the drug, to provide solutions containing 1 mg/mL. Shake vial gently to dissolve.    Route: Intramuscular    insulin regular (humuLIN R,novoLIN R) injection 2-7 Units 2-7 Units Every 6 Hours Scheduled 3/8/2024 --    Admin Instructions: Correction Insulin - Low Dose - Total Insulin Dose Less Than 40 units/day (Lean, Elderly or Renal Patients)    Blood Glucose 150-199 mg/dL - 2 units  Blood Glucose 200-249 mg/dL - 3 units  Blood Glucose 250-299 mg/dL - 4 units  Blood Glucose 300-349 mg/dL - 5 units  Blood Glucose 350-400 mg/dL - 6 units  Blood Glucose Greater Than 400 mg/dL - 7 units & Call Provider   Caution: Look alike/sound alike drug alert    Route: Subcutaneous    ketorolac (TORADOL) injection 15 mg 15 mg Every 6 Hours PRN 3/8/2024 3/13/2024    Admin Instructions: Based on patient request - if ordered for moderate or severe pain, provider allows for administration of a medication prescribed for a lower pain scale.      If given for pain, use the following pain scale:  Mild Pain = Pain Score of 1-3, CPOT 1-2  Moderate Pain = Pain Score of 4-6, CPOT 3-4  Severe Pain = Pain Score of 7-10, CPOT 5-8    Route: Intravenous    lisinopril (PRINIVIL,ZESTRIL) tablet 10 mg ([Held by provider] since 3/8/2024 10:36 AM) 10 mg Daily 3/8/2024 --    Admin Instructions: Hold for SBP less than 100, DBP less  "than 60    Route: Oral    pantoprazole (PROTONIX) EC tablet 40 mg 40 mg Every Early Morning 3/8/2024 --    Admin Instructions: Swallow whole; do not crush, split, or chew.    Route: Oral    Pharmacy to dose vancomycin  Continuous PRN 3/8/2024 3/13/2024    Route: Does not apply    polyethylene glycol (MIRALAX) packet 17 g 17 g Daily PRN 3/8/2024 --    Admin Instructions: Use if no bowel movement after 12 hours. Mix in 6-8 ounces of water.  Use 4-8 ounces of water, tea, or juice for each 17 gram dose.    Route: Oral    Linked Group 2: Placed in \"And\" Linked Group        sennosides-docusate (PERICOLACE) 8.6-50 MG per tablet 2 tablet 2 tablet 2 Times Daily PRN 3/8/2024 --    Admin Instructions: Start bowel management regimen if patient has not had a bowel movement after 12 hours.    Route: Oral    Linked Group 2: Placed in \"And\" Linked Group        vancomycin (VANCOCIN) 1000 mg/200 mL dextrose 5% IVPB 1,000 mg Every 12 Hours 3/8/2024 3/13/2024    Route: Intravenous              Assessment & Plan     8-year-old male with right great toe callus/blister, small, right foot and right leg cellulitis.       Fever    Type 2 diabetes mellitus with diabetic autonomic neuropathy, without long-term current use of insulin    HTN (hypertension)    HLD (hyperlipidemia)    Bipolar affective disorder    Pain and swelling of right lower extremity    Cellulitis of right lower extremity    Thrombocytosis    His MRI demonstrated no osteomyelitis or abscess. Clinically, his exam is benign currently. If he develops blistering on the foot or up the leg, would plan for debridement and irrigation, likely after repeat imaging to evaluate for inflammation along fascial planes. For now, plan for close observation, empiric antibiotics. Okay for weightbearing as tolerated.         Law Kemp Jr, MD  03/09/24  07:06 EST           "

## 2024-03-10 LAB
ANION GAP SERPL CALCULATED.3IONS-SCNC: 11 MMOL/L (ref 5–15)
BUN SERPL-MCNC: 18 MG/DL (ref 8–23)
BUN/CREAT SERPL: 26.5 (ref 7–25)
CALCIUM SPEC-SCNC: 8.7 MG/DL (ref 8.6–10.5)
CHLORIDE SERPL-SCNC: 102 MMOL/L (ref 98–107)
CO2 SERPL-SCNC: 22 MMOL/L (ref 22–29)
CREAT SERPL-MCNC: 0.68 MG/DL (ref 0.76–1.27)
EGFRCR SERPLBLD CKD-EPI 2021: 101.9 ML/MIN/1.73
GLUCOSE BLDC GLUCOMTR-MCNC: 104 MG/DL (ref 70–130)
GLUCOSE BLDC GLUCOMTR-MCNC: 129 MG/DL (ref 70–130)
GLUCOSE BLDC GLUCOMTR-MCNC: 169 MG/DL (ref 70–130)
GLUCOSE BLDC GLUCOMTR-MCNC: 98 MG/DL (ref 70–130)
GLUCOSE SERPL-MCNC: 157 MG/DL (ref 65–99)
POTASSIUM SERPL-SCNC: 4.2 MMOL/L (ref 3.5–5.2)
SODIUM SERPL-SCNC: 135 MMOL/L (ref 136–145)

## 2024-03-10 PROCEDURE — 82948 REAGENT STRIP/BLOOD GLUCOSE: CPT

## 2024-03-10 PROCEDURE — 97116 GAIT TRAINING THERAPY: CPT

## 2024-03-10 PROCEDURE — 25010000002 CEFTRIAXONE PER 250 MG: Performed by: FAMILY MEDICINE

## 2024-03-10 PROCEDURE — 63710000001 INSULIN REGULAR HUMAN PER 5 UNITS: Performed by: INTERNAL MEDICINE

## 2024-03-10 PROCEDURE — G0378 HOSPITAL OBSERVATION PER HR: HCPCS

## 2024-03-10 PROCEDURE — 80048 BASIC METABOLIC PNL TOTAL CA: CPT

## 2024-03-10 PROCEDURE — 25010000002 KETOROLAC TROMETHAMINE PER 15 MG: Performed by: FAMILY MEDICINE

## 2024-03-10 PROCEDURE — 97161 PT EVAL LOW COMPLEX 20 MIN: CPT

## 2024-03-10 PROCEDURE — 99232 SBSQ HOSP IP/OBS MODERATE 35: CPT | Performed by: FAMILY MEDICINE

## 2024-03-10 PROCEDURE — 97165 OT EVAL LOW COMPLEX 30 MIN: CPT

## 2024-03-10 PROCEDURE — 96376 TX/PRO/DX INJ SAME DRUG ADON: CPT

## 2024-03-10 PROCEDURE — 97530 THERAPEUTIC ACTIVITIES: CPT

## 2024-03-10 PROCEDURE — 97535 SELF CARE MNGMENT TRAINING: CPT

## 2024-03-10 RX ORDER — CETIRIZINE HYDROCHLORIDE 10 MG/1
10 TABLET ORAL DAILY
Status: DISCONTINUED | OUTPATIENT
Start: 2024-03-10 | End: 2024-03-11 | Stop reason: HOSPADM

## 2024-03-10 RX ORDER — IBUPROFEN 600 MG/1
600 TABLET ORAL EVERY 4 HOURS PRN
Status: DISCONTINUED | OUTPATIENT
Start: 2024-03-10 | End: 2024-03-11 | Stop reason: HOSPADM

## 2024-03-10 RX ORDER — HYDROXYZINE HYDROCHLORIDE 25 MG/1
25 TABLET, FILM COATED ORAL 3 TIMES DAILY PRN
Status: DISCONTINUED | OUTPATIENT
Start: 2024-03-10 | End: 2024-03-11 | Stop reason: HOSPADM

## 2024-03-10 RX ORDER — LISINOPRIL 10 MG/1
10 TABLET ORAL DAILY
Status: DISCONTINUED | OUTPATIENT
Start: 2024-03-10 | End: 2024-03-11 | Stop reason: HOSPADM

## 2024-03-10 RX ADMIN — HYDROXYZINE HYDROCHLORIDE 25 MG: 25 TABLET, FILM COATED ORAL at 23:40

## 2024-03-10 RX ADMIN — INSULIN HUMAN 2 UNITS: 100 INJECTION, SOLUTION PARENTERAL at 11:54

## 2024-03-10 RX ADMIN — KETOROLAC TROMETHAMINE 15 MG: 15 INJECTION, SOLUTION INTRAMUSCULAR; INTRAVENOUS at 14:09

## 2024-03-10 RX ADMIN — BUSPIRONE HYDROCHLORIDE 15 MG: 10 TABLET ORAL at 17:50

## 2024-03-10 RX ADMIN — LISINOPRIL 10 MG: 10 TABLET ORAL at 11:55

## 2024-03-10 RX ADMIN — KETOROLAC TROMETHAMINE 15 MG: 15 INJECTION, SOLUTION INTRAMUSCULAR; INTRAVENOUS at 20:36

## 2024-03-10 RX ADMIN — CEFTRIAXONE 2000 MG: 2 INJECTION, POWDER, FOR SOLUTION INTRAMUSCULAR; INTRAVENOUS at 14:09

## 2024-03-10 RX ADMIN — IBUPROFEN 600 MG: 600 TABLET, FILM COATED ORAL at 14:53

## 2024-03-10 RX ADMIN — BUPRENORPHINE AND NALOXONE 1 TABLET: 8; 2 TABLET SUBLINGUAL at 20:36

## 2024-03-10 RX ADMIN — ASPIRIN 81 MG: 81 TABLET, COATED ORAL at 08:53

## 2024-03-10 RX ADMIN — ATORVASTATIN CALCIUM 40 MG: 40 TABLET, FILM COATED ORAL at 08:52

## 2024-03-10 RX ADMIN — BUSPIRONE HYDROCHLORIDE 15 MG: 10 TABLET ORAL at 08:53

## 2024-03-10 RX ADMIN — DULOXETINE HYDROCHLORIDE 20 MG: 20 CAPSULE, DELAYED RELEASE ORAL at 08:52

## 2024-03-10 RX ADMIN — HYDROXYZINE HYDROCHLORIDE 25 MG: 25 TABLET, FILM COATED ORAL at 14:53

## 2024-03-10 RX ADMIN — BUPRENORPHINE AND NALOXONE 1 TABLET: 8; 2 TABLET SUBLINGUAL at 08:52

## 2024-03-10 RX ADMIN — BUSPIRONE HYDROCHLORIDE 15 MG: 10 TABLET ORAL at 11:54

## 2024-03-10 RX ADMIN — CETIRIZINE HYDROCHLORIDE 10 MG: 10 TABLET, FILM COATED ORAL at 04:13

## 2024-03-10 NOTE — PLAN OF CARE
Problem: Adult Inpatient Plan of Care  Goal: Plan of Care Review  Outcome: Ongoing, Progressing  Goal: Patient-Specific Goal (Individualized)  Outcome: Ongoing, Progressing  Goal: Absence of Hospital-Acquired Illness or Injury  Outcome: Ongoing, Progressing  Intervention: Identify and Manage Fall Risk  Recent Flowsheet Documentation  Taken 3/10/2024 0600 by Car Ma RN  Safety Promotion/Fall Prevention:   activity supervised   assistive device/personal items within reach   clutter free environment maintained   toileting scheduled   safety round/check completed   room organization consistent   nonskid shoes/slippers when out of bed  Taken 3/10/2024 0400 by Car Ma RN  Safety Promotion/Fall Prevention:   activity supervised   assistive device/personal items within reach   clutter free environment maintained   toileting scheduled   safety round/check completed   room organization consistent   nonskid shoes/slippers when out of bed  Taken 3/10/2024 0159 by Car Ma RN  Safety Promotion/Fall Prevention:   activity supervised   assistive device/personal items within reach   clutter free environment maintained   toileting scheduled   safety round/check completed   room organization consistent   nonskid shoes/slippers when out of bed  Taken 3/10/2024 0000 by Car Ma RN  Safety Promotion/Fall Prevention:   activity supervised   assistive device/personal items within reach   clutter free environment maintained   toileting scheduled   safety round/check completed   room organization consistent   nonskid shoes/slippers when out of bed  Taken 3/9/2024 2200 by Car Ma RN  Safety Promotion/Fall Prevention:   activity supervised   clutter free environment maintained   assistive device/personal items within reach   toileting scheduled   safety round/check completed   room organization consistent   nonskid shoes/slippers when out of bed  Taken 3/9/2024 2045 by Car Ma  RN  Safety Promotion/Fall Prevention:   activity supervised   clutter free environment maintained   assistive device/personal items within reach   toileting scheduled   safety round/check completed   room organization consistent   nonskid shoes/slippers when out of bed  Intervention: Prevent Skin Injury  Recent Flowsheet Documentation  Taken 3/10/2024 0600 by Car Ma RN  Body Position: position changed independently  Skin Protection:   adhesive use limited   incontinence pads utilized   tubing/devices free from skin contact  Taken 3/10/2024 0400 by Car Ma RN  Body Position: position changed independently  Skin Protection:   adhesive use limited   tubing/devices free from skin contact   incontinence pads utilized  Taken 3/10/2024 0159 by Car Ma RN  Body Position: position changed independently  Skin Protection:   adhesive use limited   incontinence pads utilized   tubing/devices free from skin contact  Taken 3/10/2024 0000 by Car Ma RN  Body Position: position changed independently  Skin Protection:   adhesive use limited   tubing/devices free from skin contact   incontinence pads utilized  Taken 3/9/2024 2200 by Car Ma RN  Body Position: position changed independently  Skin Protection:   adhesive use limited   incontinence pads utilized   tubing/devices free from skin contact  Taken 3/9/2024 2045 by Car Ma RN  Body Position: position changed independently  Intervention: Prevent and Manage VTE (Venous Thromboembolism) Risk  Recent Flowsheet Documentation  Taken 3/10/2024 0600 by Car Ma RN  Activity Management: up ad gasper  Taken 3/10/2024 0400 by Car Ma RN  Activity Management: up ad gasper  Taken 3/10/2024 0159 by Car Ma RN  Activity Management: activity minimized  Taken 3/10/2024 0000 by Car Ma RN  Activity Management: up ad gasper  Taken 3/9/2024 2200 by Car Ma RN  Activity Management:  activity minimized  Taken 3/9/2024 2045 by Car Ma RN  Activity Management: up ad gasper  VTE Prevention/Management:   bilateral   sequential compression devices off  Intervention: Prevent Infection  Recent Flowsheet Documentation  Taken 3/10/2024 0600 by Car Ma RN  Infection Prevention:   cohorting utilized   environmental surveillance performed   equipment surfaces disinfected   hand hygiene promoted   rest/sleep promoted   single patient room provided  Taken 3/10/2024 0400 by Car Ma RN  Infection Prevention:   cohorting utilized   environmental surveillance performed   equipment surfaces disinfected   hand hygiene promoted   rest/sleep promoted   single patient room provided  Taken 3/10/2024 0159 by Car Ma RN  Infection Prevention:   cohorting utilized   environmental surveillance performed   equipment surfaces disinfected   hand hygiene promoted   rest/sleep promoted   single patient room provided  Taken 3/10/2024 0000 by Car Ma RN  Infection Prevention:   cohorting utilized   environmental surveillance performed   equipment surfaces disinfected   hand hygiene promoted   rest/sleep promoted   single patient room provided  Taken 3/9/2024 2200 by Car Ma RN  Infection Prevention:   cohorting utilized   environmental surveillance performed   equipment surfaces disinfected   hand hygiene promoted   rest/sleep promoted   single patient room provided  Taken 3/9/2024 2045 by Car Ma RN  Infection Prevention:   cohorting utilized   environmental surveillance performed   equipment surfaces disinfected   hand hygiene promoted   rest/sleep promoted   single patient room provided  Goal: Optimal Comfort and Wellbeing  Outcome: Ongoing, Progressing  Intervention: Monitor Pain and Promote Comfort  Recent Flowsheet Documentation  Taken 3/9/2024 2045 by Car Ma RN  Pain Management Interventions: see MAR  Intervention: Provide Person-Centered  Care  Recent Flowsheet Documentation  Taken 3/9/2024 2045 by Car Ma RN  Trust Relationship/Rapport:   care explained   choices provided   emotional support provided   empathic listening provided   questions answered   questions encouraged   reassurance provided   thoughts/feelings acknowledged  Goal: Readiness for Transition of Care  Outcome: Ongoing, Progressing     Problem: Infection  Goal: Absence of Infection Signs and Symptoms  Outcome: Ongoing, Progressing     Problem: Behavioral Health Comorbidity  Goal: Maintenance of Behavioral Health Symptom Control  Outcome: Ongoing, Progressing  Intervention: Maintain Behavioral Health Symptom Control  Recent Flowsheet Documentation  Taken 3/10/2024 0400 by Car Ma RN  Medication Review/Management: medications reviewed  Taken 3/10/2024 0159 by Car Ma RN  Medication Review/Management: medications reviewed  Taken 3/10/2024 0000 by Car Ma RN  Medication Review/Management: medications reviewed  Taken 3/9/2024 2200 by Car aM RN  Medication Review/Management: medications reviewed  Taken 3/9/2024 2045 by Car Ma RN  Medication Review/Management: medications reviewed     Problem: Diabetes Comorbidity  Goal: Blood Glucose Level Within Targeted Range  Outcome: Ongoing, Progressing     Problem: Hypertension Comorbidity  Goal: Blood Pressure in Desired Range  Outcome: Ongoing, Progressing  Intervention: Maintain Blood Pressure Management  Recent Flowsheet Documentation  Taken 3/10/2024 0400 by Car Ma RN  Medication Review/Management: medications reviewed  Taken 3/10/2024 0159 by Car Ma RN  Medication Review/Management: medications reviewed  Taken 3/10/2024 0000 by Car Ma RN  Medication Review/Management: medications reviewed  Taken 3/9/2024 2200 by Car Ma RN  Medication Review/Management: medications reviewed  Taken 3/9/2024 2045 by Car Ma RN  Medication  Review/Management: medications reviewed     Problem: Fall Injury Risk  Goal: Absence of Fall and Fall-Related Injury  Outcome: Ongoing, Progressing  Intervention: Identify and Manage Contributors  Recent Flowsheet Documentation  Taken 3/10/2024 0400 by Car Ma RN  Medication Review/Management: medications reviewed  Taken 3/10/2024 0159 by Car Ma RN  Medication Review/Management: medications reviewed  Taken 3/10/2024 0000 by Car Ma RN  Medication Review/Management: medications reviewed  Taken 3/9/2024 2200 by Car Ma RN  Medication Review/Management: medications reviewed  Taken 3/9/2024 2045 by Car Ma RN  Medication Review/Management: medications reviewed  Intervention: Promote Injury-Free Environment  Recent Flowsheet Documentation  Taken 3/10/2024 0600 by Car Ma RN  Safety Promotion/Fall Prevention:   activity supervised   assistive device/personal items within reach   clutter free environment maintained   toileting scheduled   safety round/check completed   room organization consistent   nonskid shoes/slippers when out of bed  Taken 3/10/2024 0400 by Car Ma RN  Safety Promotion/Fall Prevention:   activity supervised   assistive device/personal items within reach   clutter free environment maintained   toileting scheduled   safety round/check completed   room organization consistent   nonskid shoes/slippers when out of bed  Taken 3/10/2024 0159 by Car Ma RN  Safety Promotion/Fall Prevention:   activity supervised   assistive device/personal items within reach   clutter free environment maintained   toileting scheduled   safety round/check completed   room organization consistent   nonskid shoes/slippers when out of bed  Taken 3/10/2024 0000 by Car Ma RN  Safety Promotion/Fall Prevention:   activity supervised   assistive device/personal items within reach   clutter free environment maintained   toileting  scheduled   safety round/check completed   room organization consistent   nonskid shoes/slippers when out of bed  Taken 3/9/2024 2200 by Car Ma, RN  Safety Promotion/Fall Prevention:   activity supervised   clutter free environment maintained   assistive device/personal items within reach   toileting scheduled   safety round/check completed   room organization consistent   nonskid shoes/slippers when out of bed  Taken 3/9/2024 2045 by Car Ma, RN  Safety Promotion/Fall Prevention:   activity supervised   clutter free environment maintained   assistive device/personal items within reach   toileting scheduled   safety round/check completed   room organization consistent   nonskid shoes/slippers when out of bed   Goal Outcome Evaluation:

## 2024-03-10 NOTE — PROGRESS NOTES
"Nathalia Jordan       LOS: 0 days   Patient Care Team:  Provider, No Known as PCP - General    Chief Complaint: Right foot/leg cellulitis    Subjective     Interval History:     Pain tolerable    Review of Systems:     Gen- No fevers, chills  CV- No chest pain, palpitations  Resp- No cough, dyspnea  GI- No N/V/D, abd pain    Objective     Vital Signs  Vital Signs (last 24 hours)         03/08 0700  03/09 0659 03/09 0700 03/09 0706   Most Recent      Temp (°F) 97.8 -  98.9       98.9 (37.2) 03/09 0314    Heart Rate 76 -  83       76 03/08 1535    Resp 16 -  20       18 03/09 0314    /69 -  155/60       155/60 03/09 0314    SpO2 (%) 98 -  100       98 03/08 1921    Flow (L/min)   2       2 03/09 0600              Physical Exam:    Cellulitis is improved.  No blistering on the foot or leg.     Results Review:     I reviewed the patient's new clinical results.    Medication Review:   Hospital Medications (active)         Dose Frequency Start End    ! Vancomycin level before 06:00 dose on [place] 3/10/24, hold dose until level checked by a pharmacist  Once 3/10/2024 --    Route: Does not apply    acetaminophen (TYLENOL) 160 MG/5ML oral solution 650 mg 650 mg Every 4 Hours PRN 3/8/2024 --    Admin Instructions: If given for fever, use fever parameter: fever greater than 100.4 °F  Based on patient request - if ordered for moderate or severe pain, provider allows for administration of a medication prescribed for a lower pain scale.    Do not exceed 4 grams of acetaminophen in a 24 hr period. Max dose of 2gm for AST/ALT greater than 120 units/L.    If given for pain, use the following pain scale:   Mild Pain = Pain Score of 1-3, CPOT 1-2  Moderate Pain = Pain Score of 4-6, CPOT 3-4  Severe Pain = Pain Score of 7-10, CPOT 5-8    Route: Oral    Linked Group 1: Placed in \"Or\" Linked Group        acetaminophen (TYLENOL) suppository 650 mg 650 mg Every 4 Hours PRN 3/8/2024 --    Admin Instructions: If given for fever, use " "fever parameter: fever greater than 100.4 °F  Based on patient request - if ordered for moderate or severe pain, provider allows for administration of a medication prescribed for a lower pain scale.    Do not exceed 4 grams of acetaminophen in a 24 hr period. Max dose of 2gm for AST/ALT greater than 120 units/L.    If given for pain, use the following pain scale:   Mild Pain = Pain Score of 1-3, CPOT 1-2  Moderate Pain = Pain Score of 4-6, CPOT 3-4  Severe Pain = Pain Score of 7-10, CPOT 5-8    Route: Rectal    Linked Group 1: Placed in \"Or\" Linked Group        acetaminophen (TYLENOL) tablet 650 mg 650 mg Every 4 Hours PRN 3/8/2024 --    Admin Instructions: If given for fever, use fever parameter: fever greater than 100.4 °F  Based on patient request - if ordered for moderate or severe pain, provider allows for administration of a medication prescribed for a lower pain scale.    Do not exceed 4 grams of acetaminophen in a 24 hr period. Max dose of 2gm for AST/ALT greater than 120 units/L.    If given for pain, use the following pain scale:   Mild Pain = Pain Score of 1-3, CPOT 1-2  Moderate Pain = Pain Score of 4-6, CPOT 3-4  Severe Pain = Pain Score of 7-10, CPOT 5-8    Route: Oral    Linked Group 1: Placed in \"Or\" Linked Group        aspirin EC tablet 81 mg 81 mg Daily 3/8/2024 --    Admin Instructions: Do not crush or chew the capsules or tablets. The drug may not work as designed if the capsule or tablet is crushed or chewed. Swallow whole.  Do not exceed 4 grams of aspirin in a 24 hr period.    If given for pain, use the following pain scale:   Mild Pain = Pain Score of 1-3, CPOT 1-2  Moderate Pain = Pain Score of 4-6, CPOT 3-4  Severe Pain = Pain Score of 7-10, CPOT 5-8    Route: Oral    atorvastatin (LIPITOR) tablet 40 mg 40 mg Daily 3/8/2024 --    Admin Instructions: Avoid grapefruit juice.    Route: Oral    bisacodyl (DULCOLAX) EC tablet 5 mg 5 mg Daily PRN 3/8/2024 --    Admin Instructions: Use if no bowel " "movement after 12 hours.  Swallow whole. Do not crush, split, or chew tablet.    Route: Oral    Linked Group 2: Placed in \"And\" Linked Group        bisacodyl (DULCOLAX) suppository 10 mg 10 mg Daily PRN 3/8/2024 --    Admin Instructions: Use if no bowel movement after 12 hours.  Hold for diarrhea    Route: Rectal    Linked Group 2: Placed in \"And\" Linked Group        buprenorphine-naloxone (SUBOXONE) 8-2 MG per SL tablet 1 tablet 1 tablet Daily 3/8/2024 --    Admin Instructions: Sublingual. Do not chew, crush, or swallow. Place under tongue and allow to dissolve. May take a small sip of water prior to placing under the tongue to aid dissolution.  If given for pain, use the following pain scale:  Mild Pain = Pain Score of 1-3, CPOT 1-2  Moderate Pain = Pain Score of 4-6, CPOT 3-4  Severe Pain = Pain Score of 7-10, CPOT 5-8    Route: Sublingual    busPIRone (BUSPAR) tablet 15 mg 15 mg 3 Times Daily 3/8/2024 --    Admin Instructions: Caution: Look alike/sound alike drug alert. Take with food.  Avoid grapefruit juice.    Route: Oral    cefTRIAXone (ROCEPHIN) 2,000 mg in sodium chloride 0.9 % 100 mL MBP 2,000 mg Every 24 Hours 3/8/2024 3/13/2024    Admin Instructions: LR should be paused and flushing of the line with NS is recommended prior to and after completion of ceftriaxone infusion due to incompatibility. Do not co-adminster with calcium-containing solutions.  Caution: Look alike/sound alike drug alert    Route: Intravenous    dextrose (D50W) (25 g/50 mL) IV injection 25 g 25 g Every 15 Minutes PRN 3/8/2024 --    Admin Instructions: Blood sugar less than 70; patient has IV access - Unresponsive, NPO or Unable To Safely Swallow    Route: Intravenous    dextrose (GLUTOSE) oral gel 15 g 15 g Every 15 Minutes PRN 3/8/2024 --    Admin Instructions: BS<70, Patient Alert, Is not NPO, Can safely swallow.    Route: Oral    DULoxetine (CYMBALTA) DR capsule 20 mg 20 mg Daily 3/8/2024 --    Admin Instructions: Do not crush or " chew the capsules or tablets. The drug may not work as designed if the capsule or tablet is crushed or chewed. Swallow whole.  Caution: Look alike/sound alike drug alert. Capsule may be opened and sprinkled on applesauce or apple juice. Do not crush or chew capsule.    Route: Oral    glucagon (GLUCAGEN) injection 1 mg 1 mg Every 15 Minutes PRN 3/8/2024 --    Admin Instructions: Blood Glucose Less Than 70 - Patient Without IV Access - Unresponsive, NPO or Unable To Safely Swallow  Reconstitute powder for injection by adding 1 mL of -supplied sterile diluent or sterile water for injection to a vial containing 1 mg of the drug, to provide solutions containing 1 mg/mL. Shake vial gently to dissolve.    Route: Intramuscular    insulin regular (humuLIN R,novoLIN R) injection 2-7 Units 2-7 Units Every 6 Hours Scheduled 3/8/2024 --    Admin Instructions: Correction Insulin - Low Dose - Total Insulin Dose Less Than 40 units/day (Lean, Elderly or Renal Patients)    Blood Glucose 150-199 mg/dL - 2 units  Blood Glucose 200-249 mg/dL - 3 units  Blood Glucose 250-299 mg/dL - 4 units  Blood Glucose 300-349 mg/dL - 5 units  Blood Glucose 350-400 mg/dL - 6 units  Blood Glucose Greater Than 400 mg/dL - 7 units & Call Provider   Caution: Look alike/sound alike drug alert    Route: Subcutaneous    ketorolac (TORADOL) injection 15 mg 15 mg Every 6 Hours PRN 3/8/2024 3/13/2024    Admin Instructions: Based on patient request - if ordered for moderate or severe pain, provider allows for administration of a medication prescribed for a lower pain scale.      If given for pain, use the following pain scale:  Mild Pain = Pain Score of 1-3, CPOT 1-2  Moderate Pain = Pain Score of 4-6, CPOT 3-4  Severe Pain = Pain Score of 7-10, CPOT 5-8    Route: Intravenous    lisinopril (PRINIVIL,ZESTRIL) tablet 10 mg ([Held by provider] since 3/8/2024 10:36 AM) 10 mg Daily 3/8/2024 --    Admin Instructions: Hold for SBP less than 100, DBP less  "than 60    Route: Oral    pantoprazole (PROTONIX) EC tablet 40 mg 40 mg Every Early Morning 3/8/2024 --    Admin Instructions: Swallow whole; do not crush, split, or chew.    Route: Oral    Pharmacy to dose vancomycin  Continuous PRN 3/8/2024 3/13/2024    Route: Does not apply    polyethylene glycol (MIRALAX) packet 17 g 17 g Daily PRN 3/8/2024 --    Admin Instructions: Use if no bowel movement after 12 hours. Mix in 6-8 ounces of water.  Use 4-8 ounces of water, tea, or juice for each 17 gram dose.    Route: Oral    Linked Group 2: Placed in \"And\" Linked Group        sennosides-docusate (PERICOLACE) 8.6-50 MG per tablet 2 tablet 2 tablet 2 Times Daily PRN 3/8/2024 --    Admin Instructions: Start bowel management regimen if patient has not had a bowel movement after 12 hours.    Route: Oral    Linked Group 2: Placed in \"And\" Linked Group        vancomycin (VANCOCIN) 1000 mg/200 mL dextrose 5% IVPB 1,000 mg Every 12 Hours 3/8/2024 3/13/2024    Route: Intravenous              Assessment & Plan     68-year-old male with right great toe callus/blister, small, right foot and right leg cellulitis.       Fever    Type 2 diabetes mellitus with diabetic autonomic neuropathy, without long-term current use of insulin    HTN (hypertension)    HLD (hyperlipidemia)    Bipolar affective disorder    Pain and swelling of right lower extremity    Cellulitis of right lower extremity    Thrombocytosis    He continues to improve clinically.  Plan to continue expectant observation.          Law Kemp Jr, MD  03/10/24  09:45 EDT           "

## 2024-03-10 NOTE — PLAN OF CARE
Goal Outcome Evaluation:  Plan of Care Reviewed With: patient           Outcome Evaluation: Physical therapy evaluation complete. The patient currently requiring use of SPC to ambulate. Patient required cues for sequencing during ambulation with SPC. Patient presents below baseline for mobility and would continue to benefit from skilled PT to address transfer and gait training. Recommend RW at hospital discharge.      Anticipated Discharge Disposition (PT): home

## 2024-03-10 NOTE — PLAN OF CARE
Goal Outcome Evaluation:  Plan of Care Reviewed With: patient           Outcome Evaluation: OT eval completed. Pt presents near baseline for ADL performance and currently does meet criteria for skilled occupational therapy intervention. Pt completed LBD, grooming, and toileting tasks with SBA, no LOB. Pt ambulated using FWW with SUP. Pt reports ambulating without AD at baseline. Recommend home at discharge when medically appropriate.      Anticipated Discharge Disposition (OT): home

## 2024-03-10 NOTE — THERAPY EVALUATION
Patient Name: Nathaila Jordan  : 1955    MRN: [id number]                              Today's Date: 3/10/2024       Admit Date: 3/7/2024    Visit Dx:     ICD-10-CM ICD-9-CM   1. Cellulitis of right leg  L03.115 682.6   2. Acute sepsis  A41.9 038.9     995.91   3. History of diabetes mellitus  Z86.39 V12.29     Patient Active Problem List   Diagnosis    Occasional tremors    Osteomyelitis of great toe of left foot    Opioid use disorder    MARTINA (generalized anxiety disorder)    Diabetic foot ulcer with osteomyelitis    Type 2 diabetes mellitus with diabetic autonomic neuropathy, without long-term current use of insulin    HTN (hypertension)    HLD (hyperlipidemia)    Bipolar affective disorder    Chronic bilateral low back pain with right-sided sciatica    Pain and swelling of right lower extremity    Cellulitis of right lower extremity    Fever    Thrombocytosis     Past Medical History:   Diagnosis Date    Anxiety     Depression     Diabetes mellitus     Fibromyalgia, primary     Hypertension     Rheumatoid arthritis      Past Surgical History:   Procedure Laterality Date    AMPUTATION DIGIT Left 2023    Procedure: LEFT 1ST TOE AMPUTATION;  Surgeon: Yomaira Espinoza MD;  Location: [facility];  Service: Vascular;  Laterality: Left;    CYST REMOVAL Right     right knee      General Information       Row Name 03/10/24 1025          Physical Therapy Time and Intention    Document Type evaluation  -ML     Mode of Treatment physical therapy  -ML       Row Name 03/10/24 1025          General Information    Patient Profile Reviewed yes  -ML     Prior Level of Function independent:;all household mobility;community mobility;gait;transfer;ADL's  -ML     Existing Precautions/Restrictions fall  -ML     Barriers to Rehab none identified  -ML       Row Name 03/10/24 1025          Living Environment    People in Home alone  -ML       Row Name 03/10/24 1025          Stairs Within Home, Primary    Stairs,  Within Home, Primary patient reports he plans to stay in a homeless shelter at discharge  -ML       Row Name 03/10/24 1025          Cognition    Orientation Status (Cognition) oriented x 3  -ML       Row Name 03/10/24 1025          Safety Issues, Functional Mobility    Safety Issues Affecting Function (Mobility) awareness of need for assistance;insight into deficits/self-awareness;positioning of assistive device;safety precaution awareness;safety precautions follow-through/compliance;sequencing abilities  -ML     Impairments Affecting Function (Mobility) balance;coordination;endurance/activity tolerance;pain  -ML               User Key  (r) = Recorded By, (t) = Taken By, (c) = Cosigned By      Initials Name Provider Type    Ginny Shankar Physical Therapist                   Mobility       Row Name 03/10/24 1028          Sit-Stand Transfer    Sit-Stand [place] (Transfers) standby assist  -ML     Assistive Device (Sit-Stand Transfers) cane, straight  -ML       Row Name 03/10/24 1028          Gait/Stairs (Locomotion)    [place] Level (Gait) verbal cues;contact guard  -ML     Assistive Device (Gait) cane, straight  -ML     Distance in Feet (Gait) 80  -ML     Deviations/Abnormal Patterns (Gait) edgar decreased;gait speed decreased  -ML     Bilateral Gait Deviations heel strike decreased  -ML     Comment, (Gait/Stairs) Patient required verbal cues for sequencing gait with SPC. Patient initially ambulates with step to gait pattern progressing to step through gait pattern.  -ML               User Key  (r) = Recorded By, (t) = Taken By, (c) = Cosigned By      Initials Name Provider Type    Ginny Shankar Physical Therapist                   Obj/Interventions       Row Name 03/10/24 1031          Range of Motion Comprehensive    General Range of Motion bilateral lower extremity ROM WFL  -ML       Row Name 03/10/24 1031          Strength Comprehensive (MMT)    General Manual Muscle Testing (MMT) Assessment lower  extremity strength deficits identified  -ML     Comment, General Manual Muscle Testing (MMT) Assessment BLE grossly 4+/5  -ML       Row Name 03/10/24 1031          Balance    Balance Assessment sitting static balance;sitting dynamic balance;sit to stand dynamic balance;standing static balance;standing dynamic balance  -ML     Static Sitting Balance independent  -ML     Dynamic Sitting Balance independent  -ML     Position, Sitting Balance unsupported;sitting in chair  -ML     Sit to Stand Dynamic Balance standby assist  -ML     Static Standing Balance standby assist  -ML     Dynamic Standing Balance contact guard  -ML     Position/Device Used, Standing Balance supported;cane, straight  -ML     Balance Interventions sitting;standing;sit to stand;occupation based/functional task;supported  -ML               User Key  (r) = Recorded By, (t) = Taken By, (c) = Cosigned By      Initials Name Provider Type    ML Ginny Mason Physical Therapist                   Goals/Plan       Row Name 03/10/24 1033          Transfer Goal 1 (PT)    Activity/Assistive Device (Transfer Goal 1, PT) sit-to-stand/stand-to-sit;bed-to-chair/chair-to-bed  -ML     [place] Level/Cues Needed (Transfer Goal 1, PT) independent  -ML     Time Frame (Transfer Goal 1, PT) 10 days  -ML       Row Name 03/10/24 1033          Gait Training Goal 1 (PT)    Activity/Assistive Device (Gait Training Goal 1, PT) gait (walking locomotion);assistive device use;decrease asymmetrical patterns;decrease fall risk;improve balance and speed;increase endurance/gait distance;walker, rolling;cane, straight  -ML     [place] Level (Gait Training Goal 1, PT) modified independence  -ML     Distance (Gait Training Goal 1, PT) 300  -ML     Time Frame (Gait Training Goal 1, PT) 10 days  -ML       Row Name 03/10/24 1033          Therapy Assessment/Plan (PT)    Planned Therapy Interventions (PT) balance training;gait training;home exercise program;neuromuscular  re-education;patient/family education;postural re-education;ROM (range of motion);strengthening;stretching;transfer training  -ML               User Key  (r) = Recorded By, (t) = Taken By, (c) = Cosigned By      Initials Name Provider Type     Ginny Mason Physical Therapist                   Clinical Impression       Row Name 03/10/24 1032          Pain    Pretreatment Pain Rating 7/10  -ML     Posttreatment Pain Rating 7/10  -ML     Pain Location - Side/Orientation Bilateral  -ML     Pain Location lower  -ML     Pain Location - back;extremity  -ML     Pain Intervention(s) Repositioned;Ambulation/increased activity  -ML       Row Name 03/10/24 1032          Plan of Care Review    Plan of Care Reviewed With patient  -     Outcome Evaluation Physical therapy evaluation complete. The patient currently requiring use of SPC to ambulate. Patient required cues for sequencing during ambulation with SPC. Patient presents below baseline for mobility and would continue to benefit from skilled PT to address transfer and gait training. Recommend RW at hospital discharge.  -       Row Name 03/10/24 1032          Therapy Assessment/Plan (PT)    Rehab Potential (PT) good, to achieve stated therapy goals  -     Criteria for Skilled Interventions Met (PT) yes;meets criteria;skilled treatment is necessary  -     Therapy Frequency (PT) daily  -ML       Row Name 03/10/24 1032          Vital Signs    Pre Patient Position Standing  -ML     Intra Patient Position Standing  -ML     Post Patient Position Sitting  -ML       Row Name 03/10/24 1032          Positioning and Restraints    Pre-Treatment Position other (comment)  standing in hallway with OT  -ML     Post Treatment Position chair  -ML     In Chair notified nsg;sitting;call light within reach;encouraged to call for assist;exit alarm on;waffle cushion  -               User Key  (r) = Recorded By, (t) = Taken By, (c) = Cosigned By      Initials Name Provider Type    SLIM  Ginny Mason Physical Therapist                   Outcome Measures       Row Name 03/10/24 1033 03/10/24 0857       How much help from another person do you currently need...    Turning from your back to your side while in flat bed without using bedrails? 4  -ML 4  -BU    Moving from lying on back to sitting on the side of a flat bed without bedrails? 4  -ML 4  -BU    Moving to and from a bed to a chair (including a wheelchair)? 4  -ML 4  -BU    Standing up from a chair using your arms (e.g., wheelchair, bedside chair)? 4  -ML 4  -BU    Climbing 3-5 steps with a railing? 3  -ML 3  -BU    To walk in hospital room? 3  -ML 4  -BU    AM-PAC 6 Clicks Score (PT) 22  -ML 23  -BU    Highest Level of Mobility Goal 7 --> Walk 25 feet or more  -ML 7 --> Walk 25 feet or more  -BU      Row Name 03/10/24 1033          Functional Assessment    Outcome Measure Options AM-PAC 6 Clicks Basic Mobility (PT)  -ML               User Key  (r) = Recorded By, (t) = Taken By, (c) = Cosigned By      Initials Name Provider Type    Ginny Shankar Physical Therapist     Jacinta Bautista RN Registered Nurse                                 Physical Therapy Education       Title: PT OT SLP Therapies (In Progress)       Topic: Physical Therapy (In Progress)       Point: Mobility training (Done)       Learning Progress Summary             Patient Acceptance, E, VU,DU,NR by ML at 3/10/2024 1034    Comment: gait pattern with use of SPC                         Point: Home exercise program (Not Started)       Learner Progress:  Not documented in this visit.              Point: Body mechanics (Done)       Learning Progress Summary             Patient Acceptance, E, VU,DU,NR by ML at 3/10/2024 1034    Comment: gait pattern with use of SPC                         Point: Precautions (Done)       Learning Progress Summary             Patient Acceptance, E, VU,DU,NR by ML at 3/10/2024 1034    Comment: gait pattern with use of SPC                                          User Key       Initials Effective Dates Name Provider Type Discipline     04/22/21 -  Ginny Mason Physical Therapist PT                  PT Recommendation and Plan  Planned Therapy Interventions (PT): balance training, gait training, home exercise program, neuromuscular re-education, patient/family education, postural re-education, ROM (range of motion), strengthening, stretching, transfer training  Plan of Care Reviewed With: patient  Outcome Evaluation: Physical therapy evaluation complete. The patient currently requiring use of SPC to ambulate. Patient required cues for sequencing during ambulation with SPC. Patient presents below baseline for mobility and would continue to benefit from skilled PT to address transfer and gait training. Recommend RW at hospital discharge.     Time Calculation:   PT Evaluation Complexity  History, PT Evaluation Complexity: 1-2 personal factors and/or comorbidities  Examination of Body Systems (PT Eval Complexity): total of 3 or more elements  Clinical Presentation (PT Evaluation Complexity): stable  Clinical Decision Making (PT Evaluation Complexity): low complexity  Overall Complexity (PT Evaluation Complexity): low complexity     PT Charges       Row Name 03/10/24 1038             Time Calculation    Start Time 1000  -ML      PT Received On 03/10/24  -ML      PT Goal Re-Cert Due Date 03/20/24  -ML         Timed Charges    43717 - Gait Training Minutes  10  -ML         Untimed Charges    PT Eval/Re-eval Minutes 31  -ML         Total Minutes    Timed Charges Total Minutes 10  -ML      Untimed Charges Total Minutes 31  -ML       Total Minutes 41  -ML                User Key  (r) = Recorded By, (t) = Taken By, (c) = Cosigned By      Initials Name Provider Type     Ginny Mason Physical Therapist                  Therapy Charges for Today       Code Description Service Date Service Provider Modifiers Qty    67765747160 HC GAIT TRAINING EA 15 MIN 3/10/2024 Marlon  Ginny GP 1    [id number] HC PT EVAL LOW COMPLEXITY 3 3/10/2024 Ginny Mason GP 1            PT G-Codes  Outcome Measure Options: AM-PAC 6 Clicks Basic Mobility (PT)  AM-PAC 6 Clicks Score (PT): 22  PT Discharge Summary  Anticipated Discharge Disposition (PT): home    Ginny Mason  3/10/2024

## 2024-03-10 NOTE — PLAN OF CARE
Goal Outcome Evaluation:   Patient admits better pain relief after new meds. Cooperative while moving around the room. Patient states the swelling in his right leg is less than on admission. Encouraged to maintain safe practices while ambulating. Blood sugars controlled at this time.

## 2024-03-10 NOTE — THERAPY DISCHARGE NOTE
Acute Care - Occupational Therapy Discharge   [place]    Patient Name: Nathalia Jordan  : 1955    MRN: [id number]                              Today's Date: 3/10/2024       Admit Date: 3/7/2024    Visit Dx:     ICD-10-CM ICD-9-CM   1. Cellulitis of right leg  L03.115 682.6   2. Acute sepsis  A41.9 038.9     995.91   3. History of diabetes mellitus  Z86.39 V12.29     Patient Active Problem List   Diagnosis    Occasional tremors    Osteomyelitis of great toe of left foot    Opioid use disorder    MARTINA (generalized anxiety disorder)    Diabetic foot ulcer with osteomyelitis    Type 2 diabetes mellitus with diabetic autonomic neuropathy, without long-term current use of insulin    HTN (hypertension)    HLD (hyperlipidemia)    Bipolar affective disorder    Chronic bilateral low back pain with right-sided sciatica    Pain and swelling of right lower extremity    Cellulitis of right lower extremity    Fever    Thrombocytosis     Past Medical History:   Diagnosis Date    Anxiety     Depression     Diabetes mellitus     Fibromyalgia, primary     Hypertension     Rheumatoid arthritis      Past Surgical History:   Procedure Laterality Date    AMPUTATION DIGIT Left 2023    Procedure: LEFT 1ST TOE AMPUTATION;  Surgeon: Yomaira Espinoza MD;  Location: [facility];  Service: Vascular;  Laterality: Left;    CYST REMOVAL Right     right knee      General Information       Row Name 03/10/24 1109          OT Time and Intention    Document Type discharge evaluation/summary  -CORY     Mode of Treatment occupational therapy  -       Row Name 03/10/24 1109          General Information    Patient Profile Reviewed yes  -CORY     Prior Level of Function independent:;ADL's;bed mobility;transfer;all household mobility;community mobility  Ambulates without AD at baseline  -CORY     Existing Precautions/Restrictions fall;other (see comments)  L shoulder pain/limited ROM; L ankle pressure wound; chronic back pain  -CORY      Barriers to Rehab none identified  -CORY       Row Name 03/10/24 1109          Occupational Profile    Environmental Supports and Barriers (Occupational Profile) Pt reports he is in the process of moving; no AE/DME owned.  -CORY       Row Name 03/10/24 1109          Living Environment    People in Home alone  -CORY       Row Name 03/10/24 1109          Home Main Entrance    Number of Stairs, Main Entrance none  -CORY       Row Name 03/10/24 1109          Stairs Within Home, Primary    Number of Stairs, Within Home, Primary none  -CORY       Row Name 03/10/24 1109          Cognition    Orientation Status (Cognition) oriented x 3  -CORY       Row Name 03/10/24 1109          Safety Issues, Functional Mobility    Safety Issues Affecting Function (Mobility) awareness of need for assistance;insight into deficits/self-awareness  -     Impairments Affecting Function (Mobility) balance;endurance/activity tolerance;pain  -               User Key  (r) = Recorded By, (t) = Taken By, (c) = Cosigned By      Initials Name Provider Type    CORY Carol Ann Florez OT Occupational Therapist                   Mobility/ADL's       Row Name 03/10/24 1111          Bed Mobility    Bed Mobility supine-sit  -     Supine-Sit [place] (Bed Mobility) modified independence  -     Assistive Device (Bed Mobility) bed rails;head of bed elevated  -       Row Name 03/10/24 1111          Transfers    Transfers sit-stand transfer  -       Row Name 03/10/24 1111          Sit-Stand Transfer    Sit-Stand [place] (Transfers) standby assist  -       Row Name 03/10/24 1111          Functional Mobility    Functional Mobility- Ind. Level supervision required  -     Functional Mobility- Device walker, front-wheeled  -     Functional Mobility-Distance (Feet) 50  -     Functional Mobility- Comment Pt ambulated using FWW with SUP, reporting mild relief from chronic back pain.  -       Row Name 03/10/24 1111          Activities of Daily Living    BADL  Assessment/Intervention lower body dressing;grooming;toileting  -[facility] Name 03/10/24 1111          Lower Body Dressing Assessment/Training    [place] Level (Lower Body Dressing) don;socks;set up  -CORY     Position (Lower Body Dressing) edge of bed sitting  -[facility] Name 03/10/24 1111          Grooming Assessment/Training    [place] Level (Grooming) hair care, combing/brushing;oral care regimen;wash face, hands;standby assist  -CORY     Position (Grooming) sink side;unsupported standing  -[facility] Name 03/10/24 1111          Toileting Assessment/Training    [place] Level (Toileting) adjust/manage clothing;perform perineal hygiene;standby assist  -CORY     Assistive Devices (Toileting) commode  -CORY     Position (Toileting) unsupported standing  -CORY     Comment, (Toileting) standing at toilet for void  -CORY               User Key  (r) = Recorded By, (t) = Taken By, (c) = Cosigned By      Initials Name Provider Type    Carol Ann Shaver OT Occupational Therapist                   Obj/Interventions       Row Name 03/10/24 1113          Sensory Assessment (Somatosensory)    Sensory Assessment (Somatosensory) UE sensation intact  -CORY       Row Name 03/10/24 1113          Vision Assessment/Intervention    Visual Impairment/Limitations WFL;corrective lenses full-time  -CORY       Row Name 03/10/24 1113          Range of Motion Comprehensive    Comment, General Range of Motion RUE ROM WFL; L shoulder limited to 90 degrees elevation  -CORY       Row Name 03/10/24 1113          Strength Comprehensive (MMT)    Comment, General Manual Muscle Testing (MMT) Assessment BUE's grossly 4+/5  -CORY       Row Name 03/10/24 1113          Balance    Balance Assessment sitting static balance;sitting dynamic balance;standing static balance;standing dynamic balance  -CORY     Static Sitting Balance independent  -CORY     Dynamic Sitting Balance independent  -CORY     Position, Sitting Balance unsupported;sitting edge of bed  -      Static Standing Balance standby assist  -     Dynamic Standing Balance supervision  -     Position/Device Used, Standing Balance supported;walker, front-wheeled  -CORY     Balance Interventions standing;dynamic;occupation based/functional task  -CORY     Comment, Balance Pt performed toileting while standing at commode with no LOB.  -               User Key  (r) = Recorded By, (t) = Taken By, (c) = Cosigned By      Initials Name Provider Type    Carol Ann Shaver, CHERYL Occupational Therapist                   Goals/Plan    No documentation.                  Clinical Impression       [facility] Name 03/10/24 1115          Pain Assessment    Pain Intervention(s) Ambulation/increased activity;Repositioned  -     Additional Documentation Pain Scale: FACES Pre/Post-Treatment (Group)  -[facility] Name 03/10/24 1115          Pain Scale: FACES Pre/Post-Treatment    Pain: FACES Scale, Pretreatment 2-->hurts little bit  -CORY     Posttreatment Pain Rating 2-->hurts little bit  -CORY     Pain Location lower  -CORY     Pain Location - back  -     Pre/Posttreatment Pain Comment Tolerated  -[facility] Name 03/10/24 1115          Plan of Care Review    Plan of Care Reviewed With patient  -     Outcome Evaluation OT eval completed. Pt presents near baseline for ADL performance and currently does meet criteria for skilled occupational therapy intervention. Pt completed LBD, grooming, and toileting tasks with SBA, no LOB. Pt ambulated using FWW with SUP. Pt reports ambulating without AD at baseline. Recommend home at discharge when medically appropriate.  -[facility] Name 03/10/24 1115          Therapy Assessment/Plan (OT)    Criteria for Skilled Therapeutic Interventions Met (OT) no;no problems identified which require skilled intervention  -     Therapy Frequency (OT) daily  -[facility] Name 03/10/24 1115          Therapy Plan Review/Discharge Plan (OT)    Anticipated Discharge Disposition (OT) home  -[facility] Name  03/10/24 1115          Vital Signs    O2 Delivery Pre Treatment room air  -CORY     O2 Delivery Post Treatment room air  -CORY     Pre Patient Position Supine  -CORY     Intra Patient Position Standing  -CORY     Post Patient Position Standing  -CORY       Row Name 03/10/24 1115          Positioning and Restraints    Pre-Treatment Position in bed  -CORY     Post Treatment Position other  -CORY     Other Position with PT  -CORY               User Key  (r) = Recorded By, (t) = Taken By, (c) = Cosigned By      Initials Name Provider Type    Carol Ann Shaver OT Occupational Therapist                   Outcome Measures       Row Name 03/10/24 1120          How much help from another is currently needed...    Putting on and taking off regular lower body clothing? 4  -CORY     Bathing (including washing, rinsing, and drying) 4  -CORY     Toileting (which includes using toilet bed pan or urinal) 4  -CORY     Putting on and taking off regular upper body clothing 4  -CORY     Taking care of personal grooming (such as brushing teeth) 4  -CORY     Eating meals 4  -CORY     AM-PAC 6 Clicks Score (OT) 24  -CORY       Row Name 03/10/24 1033 03/10/24 0857       How much help from another person do you currently need...    Turning from your back to your side while in flat bed without using bedrails? 4  -ML 4  -BU    Moving from lying on back to sitting on the side of a flat bed without bedrails? 4  -ML 4  -BU    Moving to and from a bed to a chair (including a wheelchair)? 4  -ML 4  -BU    Standing up from a chair using your arms (e.g., wheelchair, bedside chair)? 4  -ML 4  -BU    Climbing 3-5 steps with a railing? 3  -ML 3  -BU    To walk in hospital room? 3  -ML 4  -BU    AM-PAC 6 Clicks Score (PT) 22  -ML 23  -BU    Highest Level of Mobility Goal 7 --> Walk 25 feet or more  -ML 7 --> Walk 25 feet or more  -BU      Row Name 03/10/24 1120 03/10/24 1033       Functional Assessment    Outcome Measure Options AM-PAC 6 Clicks Daily Activity (OT)  -CORY AM-PAC 6  Clicks Basic Mobility (PT)  -ML              User Key  (r) = Recorded By, (t) = Taken By, (c) = Cosigned By      Initials Name Provider Type    Carol Ann Shaver OT Occupational Therapist    Ginny Shankar Physical Therapist    Jacinta Tilley, RN Registered Nurse                  Occupational Therapy Education       Title: PT OT SLP Therapies (In Progress)       Topic: Occupational Therapy (In Progress)       Point: ADL training (Done)       Description:   Instruct learner(s) on proper safety adaptation and remediation techniques during self care or transfers.   Instruct in proper use of assistive devices.                  Learning Progress Summary             Patient Acceptance, E, VU by CORY at 3/10/2024 1121    Comment: Reason for OT consult; benefits of AE/DME                         Point: Home exercise program (Not Started)       Description:   Instruct learner(s) on appropriate technique for monitoring, assisting and/or progressing therapeutic exercises/activities.                  Learner Progress:  Not documented in this visit.              Point: Precautions (Done)       Description:   Instruct learner(s) on prescribed precautions during self-care and functional transfers.                  Learning Progress Summary             Patient Acceptance, E, VU by CORY at 3/10/2024 1121    Comment: Reason for OT consult; benefits of AE/DME                         Point: Body mechanics (Not Started)       Description:   Instruct learner(s) on proper positioning and spine alignment during self-care, functional mobility activities and/or exercises.                  Learner Progress:  Not documented in this visit.                              User Key       Initials Effective Dates Name Provider Type [facility] 06/16/21 -  Carol Ann Florez OT Occupational Therapist OT                  OT Recommendation and Plan  Therapy Frequency (OT): daily  Plan of Care Review  Plan of Care Reviewed With: patient  Outcome  Evaluation: OT eval completed. Pt presents near baseline for ADL performance and currently does meet criteria for skilled occupational therapy intervention. Pt completed LBD, grooming, and toileting tasks with SBA, no LOB. Pt ambulated using FWW with SUP. Pt reports ambulating without AD at baseline. Recommend home at discharge when medically appropriate.  Plan of Care Reviewed With: patient  Outcome Evaluation: OT eval completed. Pt presents near baseline for ADL performance and currently does meet criteria for skilled occupational therapy intervention. Pt completed LBD, grooming, and toileting tasks with SBA, no LOB. Pt ambulated using FWW with SUP. Pt reports ambulating without AD at baseline. Recommend home at discharge when medically appropriate.     Time Calculation:   Evaluation Complexity (OT)  Review Occupational Profile/Medical/Therapy History Complexity: brief/low complexity  Assessment, Occupational Performance/Identification of Deficit Complexity: 1-3 performance deficits  Clinical Decision Making Complexity (OT): problem focused assessment/low complexity  Overall Complexity of Evaluation (OT): low complexity     Time Calculation- OT       Row Name 03/10/24 1038 03/10/24 0938          Time Calculation- OT    OT Start Time -- 0938  -CORY     OT Received On -- 03/10/24  -CORY     OT Goal Re-Cert Due Date -- 03/20/24  -CORY        Timed Charges    40812 - Gait Training Minutes  10  -ML --     37753 - OT Therapeutic Activity Minutes -- 12  -CORY     45702 - OT Self Care/Mgmt Minutes -- 12  -CORY        Untimed Charges    OT Eval/Re-eval Minutes -- 30  -CORY        Total Minutes    Timed Charges Total Minutes 10  -ML 24  -CORY     Untimed Charges Total Minutes -- 30  -CORY      Total Minutes 10  -ML 54  -CORY               User Key  (r) = Recorded By, (t) = Taken By, (c) = Cosigned By      Initials Name Provider Type    Carol Ann Shaver OT Occupational Therapist    Ginny Shankar Physical Therapist                  Therapy  Charges for Today       Code Description Service Date Service Provider Modifiers Qty    92387188921  OT THERAPEUTIC ACT EA 15 MIN 3/10/2024 CarolA nn Florez OT GO 1    40396736764 HC OT SELF CARE/MGMT/TRAIN EA 15 MIN 3/10/2024 Carol Ann Florez OT GO 1    [id number]  OT EVAL LOW COMPLEXITY 2 3/10/2024 Carol Ann Florez OT GO 1               OT Discharge Summary  Anticipated Discharge Disposition (OT): home    Carol Ann Florez OT  3/10/2024

## 2024-03-11 ENCOUNTER — READMISSION MANAGEMENT (OUTPATIENT)
Dept: CALL CENTER | Facility: HOSPITAL | Age: 69
End: 2024-03-11
Payer: MEDICARE

## 2024-03-11 VITALS
HEIGHT: 75 IN | DIASTOLIC BLOOD PRESSURE: 73 MMHG | WEIGHT: 189.82 LBS | SYSTOLIC BLOOD PRESSURE: 160 MMHG | OXYGEN SATURATION: 98 % | TEMPERATURE: 97.6 F | RESPIRATION RATE: 16 BRPM | HEART RATE: 69 BPM | BODY MASS INDEX: 23.6 KG/M2

## 2024-03-11 PROBLEM — L03.115 CELLULITIS OF RIGHT LOWER EXTREMITY: Status: RESOLVED | Noted: 2024-03-08 | Resolved: 2024-03-11

## 2024-03-11 PROBLEM — R40.0 SOMNOLENCE: Status: ACTIVE | Noted: 2024-03-11

## 2024-03-11 PROBLEM — R40.0 SOMNOLENCE: Status: RESOLVED | Noted: 2024-03-11 | Resolved: 2024-03-11

## 2024-03-11 LAB
GLUCOSE BLDC GLUCOMTR-MCNC: 104 MG/DL (ref 70–130)
GLUCOSE BLDC GLUCOMTR-MCNC: 162 MG/DL (ref 70–130)

## 2024-03-11 PROCEDURE — 25010000002 KETOROLAC TROMETHAMINE PER 15 MG: Performed by: FAMILY MEDICINE

## 2024-03-11 PROCEDURE — 63710000001 INSULIN REGULAR HUMAN PER 5 UNITS: Performed by: INTERNAL MEDICINE

## 2024-03-11 PROCEDURE — 97530 THERAPEUTIC ACTIVITIES: CPT

## 2024-03-11 PROCEDURE — 96376 TX/PRO/DX INJ SAME DRUG ADON: CPT

## 2024-03-11 PROCEDURE — 93010 ELECTROCARDIOGRAM REPORT: CPT | Performed by: INTERNAL MEDICINE

## 2024-03-11 PROCEDURE — 82948 REAGENT STRIP/BLOOD GLUCOSE: CPT

## 2024-03-11 PROCEDURE — 99238 HOSP IP/OBS DSCHRG MGMT 30/<: CPT | Performed by: INTERNAL MEDICINE

## 2024-03-11 PROCEDURE — G0378 HOSPITAL OBSERVATION PER HR: HCPCS

## 2024-03-11 PROCEDURE — 93005 ELECTROCARDIOGRAM TRACING: CPT | Performed by: FAMILY MEDICINE

## 2024-03-11 RX ORDER — IBUPROFEN 800 MG/1
800 TABLET ORAL EVERY 8 HOURS PRN
Qty: 30 TABLET | Refills: 0 | Status: SHIPPED | OUTPATIENT
Start: 2024-03-11 | End: 2024-04-10

## 2024-03-11 RX ORDER — CEPHALEXIN 500 MG/1
500 CAPSULE ORAL 4 TIMES DAILY
Qty: 16 CAPSULE | Refills: 0 | Status: SHIPPED | OUTPATIENT
Start: 2024-03-11 | End: 2024-03-15

## 2024-03-11 RX ORDER — OMEPRAZOLE 40 MG/1
40 CAPSULE, DELAYED RELEASE ORAL DAILY
COMMUNITY

## 2024-03-11 RX ORDER — LISINOPRIL 20 MG/1
20 TABLET ORAL DAILY
COMMUNITY

## 2024-03-11 RX ORDER — BUPRENORPHINE HYDROCHLORIDE AND NALOXONE HYDROCHLORIDE DIHYDRATE 8; 2 MG/1; MG/1
2 TABLET SUBLINGUAL DAILY
COMMUNITY

## 2024-03-11 RX ORDER — IBUPROFEN 800 MG/1
800 TABLET ORAL 2 TIMES DAILY PRN
COMMUNITY

## 2024-03-11 RX ORDER — HYDROXYZINE HYDROCHLORIDE 25 MG/1
25 TABLET, FILM COATED ORAL 3 TIMES DAILY PRN
Qty: 90 TABLET | Refills: 0 | Status: SHIPPED | OUTPATIENT
Start: 2024-03-11 | End: 2024-04-10

## 2024-03-11 RX ORDER — HYDROXYZINE PAMOATE 50 MG/1
50 CAPSULE ORAL 3 TIMES DAILY PRN
COMMUNITY

## 2024-03-11 RX ORDER — ACETAMINOPHEN 500 MG
1-2 TABLET ORAL 4 TIMES DAILY PRN
COMMUNITY

## 2024-03-11 RX ORDER — ACETAMINOPHEN 500 MG
500 TABLET ORAL EVERY 6 HOURS PRN
Qty: 120 TABLET | Refills: 0 | Status: SHIPPED | OUTPATIENT
Start: 2024-03-11 | End: 2024-04-10

## 2024-03-11 RX ORDER — CHLORTHALIDONE 25 MG/1
25 TABLET ORAL DAILY
COMMUNITY

## 2024-03-11 RX ADMIN — ATORVASTATIN CALCIUM 40 MG: 40 TABLET, FILM COATED ORAL at 09:57

## 2024-03-11 RX ADMIN — ASPIRIN 81 MG: 81 TABLET, COATED ORAL at 09:57

## 2024-03-11 RX ADMIN — CETIRIZINE HYDROCHLORIDE 10 MG: 10 TABLET, FILM COATED ORAL at 09:57

## 2024-03-11 RX ADMIN — BUSPIRONE HYDROCHLORIDE 15 MG: 10 TABLET ORAL at 09:57

## 2024-03-11 RX ADMIN — INSULIN HUMAN 2 UNITS: 100 INJECTION, SOLUTION PARENTERAL at 11:46

## 2024-03-11 RX ADMIN — DULOXETINE HYDROCHLORIDE 20 MG: 20 CAPSULE, DELAYED RELEASE ORAL at 09:57

## 2024-03-11 RX ADMIN — BUPRENORPHINE AND NALOXONE 1 TABLET: 8; 2 TABLET SUBLINGUAL at 09:57

## 2024-03-11 RX ADMIN — KETOROLAC TROMETHAMINE 15 MG: 15 INJECTION, SOLUTION INTRAMUSCULAR; INTRAVENOUS at 10:03

## 2024-03-11 RX ADMIN — BUSPIRONE HYDROCHLORIDE 15 MG: 10 TABLET ORAL at 11:46

## 2024-03-11 RX ADMIN — LISINOPRIL 10 MG: 10 TABLET ORAL at 10:03

## 2024-03-11 NOTE — NURSING NOTE
Discharge teaching done with patient. All questions answered and written materials supplied. IV discontinued and site is clean, dry and intact. Awaiting meds to bed delivery.

## 2024-03-11 NOTE — PLAN OF CARE
Goal Outcome Evaluation:  Plan of Care Reviewed With: patient        Progress: improving  Outcome Evaluation: Patient demonstrating supervision to independent for all transfers and ambulation with SPC without loss of balance this date. Gait distance limited by anterior ankle wound and dorsal foot discomfort. Patient tried unloading shoe and CAM boot to reduce foot pain but prefers to stay with his current boot which was prescribed by his podiatrist. No further IP PT skills warranted at this time. Recommend home at [place].      Anticipated Discharge Disposition (PT): home

## 2024-03-11 NOTE — DISCHARGE SUMMARY
[facility] Medicine Services  DISCHARGE SUMMARY    Patient Name: Nathalia Jordan  : 1955  MRN: [id number]    Date of Admission: 3/7/2024 10:28 PM  Date of Discharge:  3/11  Primary Care Physician: Provider, No Known    Consults       Date and Time Order Name Status Description    3/8/2024  6:45 AM Inpatient Orthopedic Surgery Consult Completed             Hospital Course     Presenting Problem: right leg cellulitis    Active Hospital Problems    Diagnosis  POA   • Thrombocytosis [D75.839]  Yes   • HLD (hyperlipidemia) [E78.5]  Yes   • HTN (hypertension) [I10]  Yes   • Bipolar affective disorder [F31.9]  Yes   • Type 2 diabetes mellitus with diabetic autonomic neuropathy, without long-term current use of insulin [E11.43]  Yes      Resolved Hospital Problems    Diagnosis Date Resolved POA   • **Cellulitis of right lower extremity [L03.115] 2024 Yes   • Somnolence [R40.0] 2024 Unknown          Hospital Course:  Nathalia Jordan is a 68 y.o. male w DMII, currently homeless, s/p left great toe amputation, OUD on suboxone who presented with painful, swollen RLE.   Patient was found to have sepsis 2/2 above RLE cellulitis. Was treated initially with vanc + rocephin, then narrowed to rocephin alone given MRSA swab negative. Considerable improvement in symptoms quickly with appropriate abx, imaging without underlying osteomyelitis. Will complete course with keflex to complete 7 days total of antibiotic treatment  Somnolence on night of admission into next morning that quickly resolved with supportive care. Patient reports taking hydroxyzine 100 mg each night - this was decreased to 25 mg TID while here - instructed to continue lower dose at home given this as possible contributor. Also on the ddx is septic encephalopathy.    Unclear home medications for patient - to resume his previous medications at discharge without change other than addition of keflex above    Patient residing at  [facility], TELLY helping to arrange    Discharge Follow Up Recommendations for outpatient labs/diagnostics:   F/u PCP 1 week    Day of Discharge     HPI:   Feeling much better, walking around the room. Afebrile    Vital Signs:   Temp:  [97 °F (36.1 °C)-99.1 °F (37.3 °C)] 97.6 °F (36.4 °C)  Heart Rate:  [69-71] 69  Resp:  [16-18] 16  BP: (158-181)/(59-87) 160/73      Physical Exam:  Constitutional: No acute distress, awake, alert  HENT: NCAT, mucous membranes moist  Respiratory: Clear to auscultation bilaterally, respiratory effort normal   Cardiovascular: RRR, no murmurs, rubs, or gallops  Gastrointestinal: Soft, nontender, nondistended  Musculoskeletal: Muscle tone within normal limits, no joint effusions appreciated  Psychiatric: Appropriate affect, cooperative  Neurologic: Alert and oriented, facial movements symmetric and spontaneous movement of all 4 extremities grossly equal bilaterally, speech clear  Skin: Very well appearing bilateral legs without obvious cellulitis. Small wound w bandage over it, s/p toe amputation noted.      Pertinent  and/or Most Recent Results     LAB RESULTS:      Lab 03/09/24  1136 03/08/24  0706 03/07/24  2304   WBC 8.11 9.81 10.60   HEMOGLOBIN 9.8* 10.9* 10.3*   HEMATOCRIT 31.5* 35.5* 32.8*   PLATELETS 376 411 482*   NEUTROS ABS  --  4.93 5.82   IMMATURE GRANS (ABS)  --  0.05 0.06*   LYMPHS ABS  --  2.82 2.85   MONOS ABS  --  1.50* 1.40*   EOS ABS  --  0.48* 0.45*   MCV 92.4 94.2 92.4   SED RATE  --   --  87*   CRP  --   --  1.47*   PROCALCITONIN  --   --  0.08   LACTATE  --   --  0.8         Lab 03/10/24  0405 03/09/24  1136 03/08/24  0706 03/07/24  2304   SODIUM 135* 139 137 132*   POTASSIUM 4.2 4.9 4.5 4.4   CHLORIDE 102 104 103 97*   CO2 22.0 26.0 24.0 27.0   ANION GAP 11.0 9.0 10.0 8.0   BUN 18 10 12 17   CREATININE 0.68* 0.64* 0.79 0.87   EGFR 101.9 103.8 97.4 94.6   GLUCOSE 157* 181* 107* 113*   CALCIUM 8.7 9.2 9.3 9.0   HEMOGLOBIN A1C  --   --  6.20*  --          Lab  03/07/24  2304   TOTAL PROTEIN 7.3   ALBUMIN 4.1   GLOBULIN 3.2   ALT (SGPT) 18   AST (SGOT) 18   BILIRUBIN <0.2   ALK PHOS 115         Lab 03/07/24  2304   PROBNP 52.5   HSTROP T 27*             Lab 03/08/24  0706   IRON 53*   IRON SATURATION (TSAT) 10*   TIBC 524   TRANSFERRIN 352   FERRITIN 29.84*   FOLATE >20.00   VITAMIN B 12 347         Lab 03/08/24  0259   PH, ARTERIAL 7.436   PCO2, ARTERIAL 39.4   PO2 ART 72.3*   FIO2 21   HCO3 ART 26.5*   BASE EXCESS ART 2.2*   CARBOXYHEMOGLOBIN 1.7     Brief Urine Lab Results  (Last result in the past 365 days)        Color   Clarity   Blood   Leuk Est   Nitrite   Protein   CREAT   Urine HCG        03/07/24 2303 Yellow   Clear   Negative   Negative   Negative   Negative                 Microbiology Results (last 10 days)       Procedure Component Value - Date/Time    MRSA Screen, PCR (Inpatient) - Swab, Nares [[id number]]  (Normal) Collected: 03/09/24 1137    Lab Status: Final result Specimen: Swab from Nares Updated: 03/09/24 1321     MRSA PCR Negative    Narrative:      The negative predictive value of this diagnostic test is high and should only be used to consider de-escalating anti-MRSA therapy. A positive result may indicate colonization with MRSA and must be correlated clinically.  MRSA Negative    Blood Culture - Blood, Hand, Right [[id number]]  (Normal) Collected: 03/08/24 0025    Lab Status: Preliminary result Specimen: Blood from Hand, Right Updated: 03/11/24 0215     Blood Culture No growth at 3 days    COVID PRE-OP / PRE-PROCEDURE SCREENING ORDER (NO ISOLATION) - Swab, Nasopharynx [[id number]]  (Normal) Collected: 03/07/24 2312    Lab Status: Final result Specimen: Swab from Nasopharynx Updated: 03/07/24 2347    Narrative:      The following orders were created for panel order COVID PRE-OP / PRE-PROCEDURE SCREENING ORDER (NO ISOLATION) - Swab, Nasopharynx.  Procedure                               Abnormality         Status                     ---------                                -----------         ------                     COVID-19 and FLU A/B PCR...[[id number]]  Normal              Final result                 Please view results for these tests on the individual orders.    COVID-19 and FLU A/B PCR, 1 HR TAT - Swab, Nasopharynx [[id number]]  (Normal) Collected: 03/07/24 2312    Lab Status: Final result Specimen: Swab from Nasopharynx Updated: 03/07/24 2349     COVID19 Not Detected     Influenza A PCR Not Detected     Influenza B PCR Not Detected    Narrative:      Fact sheet for providers: https://www.fda.gov/media/850215/download    Fact sheet for patients: https://www.fda.gov/media/227931/download    Test performed by PCR.    Blood Culture - Blood, Arm, Right [[id number]]  (Normal) Collected: 03/07/24 2304    Lab Status: Preliminary result Specimen: Blood from Arm, Right Updated: 03/11/24 0030     Blood Culture No growth at 3 days            Duplex Venous Lower Extremity - Right CAR    Result Date: 3/8/2024  •  Normal right lower extremity venous duplex scan.     MRI Foot Right Without Contrast    Result Date: 3/8/2024  MRI FOOT RIGHT WO CONTRAST Date of Exam: 3/8/2024 6:02 AM EST Indication: eval for osteo of the R great toe.  Comparison: None available. Technique:  Routine multiplanar/multisequence sequence images of the right foot were obtained without contrast administration. Findings: Scattered edema is noted throughout the subcutaneous soft tissues of the first through fifth digits, forefoot, and midfoot. The changes are most pronounced throughout the subcutaneous soft tissues along the dorsal aspect of the midfoot. The findings are nonspecific but suggest changes of soft tissue cellulitis. There is also edema at the nailbed of the great toe which suggests potential involvement of the nailbed. A cutaneous fluid collection is seen along the medial aspect of the great toe suggesting a  blister measuring 0.7 x 1.0 x 0.2 cm. No additional fluid collection is seen to  indicate additional potential abscess formation. No cortical erosion or destruction is observed. No periostitis is identified. No abnormal bone marrow signal is observed. There are no definitive signs of osteomyelitis.     Impression: 1.Soft tissue edema throughout the first through fifth digits and extending into the midfoot. The findings are most pronounced in the subcutaneous soft tissues along the dorsal aspect of the midfoot. The findings are nonspecific but suggest changes of soft tissue cellulitis. 2.Evidence for a blister involving the cutaneous soft tissues at the medial aspect of the great toe measuring up to 1 cm. Otherwise there is no evidence for additional abscess. 3.No evidence for osteomyelitis. Electronically Signed: Alphonse Verdin MD  3/8/2024 7:12 AM EST  Workstation ID: [id number]    MRI Brain Without Contrast    Result Date: 3/8/2024  MRI BRAIN WO CONTRAST Date of Exam: 3/8/2024 5:37 AM EST Indication: AMS.  Comparison: CT head 3/8/2024. Technique:  Routine multiplanar/multisequence sequence images of the brain were obtained without contrast administration. Findings: There is no diffusion restriction to suggest acute infarct. No mass effect, midline shift or abnormal extra-axial collection. There is a chronic right parietal lobe infarct. There is a chronic infarct in the right basal ganglia accounting for the hypodensity on CT. There are other scattered patchy and small punctate foci of FLAIR hyperintense signal within the cerebral white matter. There is a tiny chronic lacunar infarct in the left basal ganglia. There is a chronic lacunar type infarct in the inferior left cerebellum. There is no acute or chronic intracranial hemorrhage. The midline structures appear intact. Calvarial and superficial soft tissue signal is within normal limits. Orbits appear unremarkable. There is mild diffuse paranasal sinus mucosal disease. Mastoid air cells are well aerated. Major arterial flow voids appear  intact.     Impression: 1.No acute intracranial abnormality. 2.Chronic infarcts in the right parietal lobe, right basal ganglia and left cerebellum. 3.Mild chronic small vessel ischemic change. 4.Mild diffuse paranasal sinus mucosal disease. Electronically Signed: Hiren Villafuerte MD  3/8/2024 6:30 AM EST  Workstation ID: [id number]    XR Abdomen KUB    Result Date: 3/8/2024  XR ABDOMEN KUB Date of Exam: 3/8/2024 4:40 AM EST Indication: MRI CLEARENCE Comparison: CT head 3/8/2024 and chest radiograph same date. Findings: There is no unexpected metal artifact. There is colonic fecal retention. Reevaluation of chest radiograph demonstrates no unexpected metal artifact. Evaluation of prior head CT demonstrates no unexpected metal artifact.     Impression: No contraindication to MRI. No contraindication to MRI on head CT or chest radiograph either. Electronically Signed: Hiren Villafuerte MD  3/8/2024 4:58 AM EST  Workstation ID: [id number]    XR Humerus Left    Result Date: 3/8/2024  XR HUMERUS LEFT Date of Exam: 3/8/2024 4:09 AM EST Indication: ? avascular necrosis seen on CXR of left humeral head Comparison: None available. Findings: There is an old healed left humeral neck fracture. There is avascular necrosis of the left humeral head with cortical collapse. There is glenohumeral joint degeneration. The acromioclavicular joint appears within normal limits. Adjacent lung and ribs appear intact. There is maintenance of the acromiohumeral and coracoclavicular distances.     Impression: 1.Avascular necrosis of the left humeral head with cortical collapse. 2.Old healed left humeral neck fracture. Electronically Signed: Hiren Villafuerte MD  3/8/2024 4:25 AM EST  Workstation ID: [id number]    CT Head Without Contrast    Result Date: 3/8/2024  CT HEAD WO CONTRAST Date of Exam: 3/8/2024 3:43 AM EST Indication: AMS. Comparison: None available. Technique: Axial CT images were obtained of the head without contrast administration.  Automated  exposure control and iterative construction methods were used. Findings: There is a chronic right parietal lobe infarct. There is mild chronic small vessel ischemic change. No acute intracranial hemorrhage. No hyperdense vessels. No mass effect, midline shift or abnormal extra-axial collection. There is hypodensity in the right globus pallidus of indeterminate age, left basal ganglia, brainstem and cerebellum appear unremarkable. Orbits appear within normal limits. There is mild maxillary and ethmoid sinus mucosal disease. Mastoids are clear. Superficial soft tissues are unremarkable.     Impression: 1.No acute intracranial abnormality. 2.Chronic right parietal lobe infarct and mild chronic small vessel ischemic change. 3.Hypodensity in the right globus pallidus of indeterminate age. If there is concern for acute infarct, consider MRI. Electronically Signed: Hiren Villafuerte MD  3/8/2024 3:55 AM EST  Workstation ID: [id number]    CT Lower Extremity Right With Contrast    Result Date: 3/8/2024  CT LOWER EXTREMITY RIGHT W CONTRAST Date of Exam: 3/8/2024 1:26 AM EST Indication: cellulitis of the rle with concern for osteo of the R great toe. Comparison: None available. Technique: Axial CT images were obtained of the right lower extremity after the uneventful intravenous administration of 100 mL Isovue-300.  Reconstructed coronal and sagittal images were also obtained. Automated exposure control and iterative construction methods were used. Findings: There is soft tissue irregularity in the region of the first digit nailbed. There is associated skin thickening and subcutaneous edema most pronounced at the medial aspect of the distal first digit. There is diffuse subcutaneous edema throughout the dorsum of the foot and circumferentially at the level of the ankle and throughout the calcaneal soft tissues. There is no organized soft tissue collection to suggest abscess. There is no deep subcutaneous gas. There is no bone  destruction or erosion to suggest osteomyelitis. There are small hindfoot and midfoot osteophytes. Joint spaces are generally preserved throughout. Interphalangeal and MTP joints appear intact. There is three-vessel runoff into the right foot. The Achilles tendon and the flexor and extensor tendons of the foot and ankle appear grossly intact.     Impression: 1.Soft tissue irregularity in the region of the first digit nailbed. There is associated skin thickening and subcutaneous edema most pronounced at the medial aspect of the distal first digit. 2.No CT evidence of osteomyelitis. 3.There is diffuse subcutaneous edema throughout the foot and ankle. No organized soft tissue collection to suggest abscess. No deep subcutaneous gas. 4.Mild hindfoot and midfoot osteoarthritis. Electronically Signed: Hiren Villafuerte MD  3/8/2024 1:43 AM EST  Workstation ID: [id number]    XR Chest 1 View    Result Date: 3/8/2024  XR CHEST 1 VW Date of Exam: 3/8/2024 12:08 AM EST Indication: sob Comparison: CT chest 6/27/2023. Findings: There is no pneumothorax, pleural effusion or focal airspace consolidation. Heart size and pulmonary vasculature appear within normal limits. Regional bones appear intact. There is a mixed sclerotic and lucent appearance of the proximal left humerus with  some flattening of the humeral head, which may represent avascular necrosis     Impression: 1. No acute cardiopulmonary abnormality. 2. 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 Electronically Signed: Hiren Villafuerte MD  3/8/2024 12:27 AM EST  Workstation ID: [id number]     Results for orders placed during the hospital encounter of 03/07/24    Duplex Venous Lower Extremity - Right CAR    Interpretation Summary  •  Normal right lower extremity venous duplex scan.      Results for orders placed during the hospital encounter of 03/07/24    Duplex Venous Lower Extremity - Right CAR    Interpretation Summary  •  Normal right  lower extremity venous duplex scan.      Results for orders placed during the hospital encounter of 06/21/23    Adult Transthoracic Echo Complete W/ Cont if Necessary Per Protocol    Interpretation Summary  •  : Left ventricular systolic function is normal. Calculated left ventricular EF = 59% Normal left ventricular cavity size noted. Left ventricular wall thickness is consistent with hypertrophy. Sigmoid-shaped ventricular septum is present. All left ventricular wall segments contract normally. Left ventricular diastolic function was normal.  •  Normal left atrial cavity size noted. Saline test results are negative.      Plan for Follow-up of Pending Labs/Results:   Pending Labs       Order Current Status    Blood Culture - Blood, Arm, Right Preliminary result    Blood Culture - Blood, Hand, Right Preliminary result          Discharge Details        Discharge Medications        New Medications        Instructions Start Date   acetaminophen 500 MG tablet  Commonly known as: TYLENOL   500 mg, Oral, Every 6 Hours PRN      cephalexin 500 MG capsule  Commonly known as: Keflex   500 mg, Oral, 4 Times Daily      hydrOXYzine 25 MG tablet  Commonly known as: ATARAX   25 mg, Oral, 3 Times Daily PRN      ibuprofen 800 MG tablet  Commonly known as: ADVIL,MOTRIN   800 mg, Oral, Every 8 Hours PRN      PHARMACY MEDS TO BED CONSULT   Does not apply, Daily             Continue These Medications        Instructions Start Date   aspirin 81 MG EC tablet   81 mg, Oral, Daily      atorvastatin 40 MG tablet  Commonly known as: LIPITOR   40 mg, Oral, Daily      busPIRone 15 MG tablet  Commonly known as: BUSPAR   15 mg, Oral, 3 Times Daily      cyclobenzaprine 5 MG tablet  Commonly known as: FLEXERIL   5 mg, Oral, 3 Times Daily PRN, Take 1 tablet by mouth every 8 hours as needed.      DULoxetine 20 MG capsule  Commonly known as: CYMBALTA   20 mg, Oral, Daily      lisinopril 10 MG tablet  Commonly known as: PRINIVIL,ZESTRIL   10 mg, Oral,  Daily      metFORMIN 1000 MG tablet  Commonly known as: GLUCOPHAGE   1,000 mg, Oral, 2 Times Daily With Meals      methocarbamol 500 MG tablet  Commonly known as: ROBAXIN   500 mg, Oral, 4 Times Daily      omeprazole 20 MG capsule  Commonly known as: priLOSEC   20 mg, Oral, Daily      pregabalin 200 MG capsule  Commonly known as: Lyrica   200 mg, Oral, Daily      QUEtiapine 25 MG tablet  Commonly known as: SEROquel   25 mg, Oral, Nightly      SITagliptin 100 MG tablet  Commonly known as: JANUVIA   100 mg, Oral, Daily               No Known Allergies      Discharge Disposition:  Home or Self Care    Diet:  Hospital:  Diet Order   Procedures   • Diet: Diabetic; Consistent Carbohydrate; Fluid Consistency: Thin (IDDSI 0)            Activity:      Restrictions or Other Recommendations:         CODE STATUS:    Code Status and Medical Interventions:   Ordered at: 03/08/24 0335     Code Status (Patient has no pulse and is not breathing):    CPR (Attempt to Resuscitate)     Medical Interventions (Patient has pulse or is breathing):    Full Support       No future appointments.              Kialey Gonzalez MD  03/11/24      Time Spent on Discharge:  I spent  20  minutes on this discharge activity which included: face-to-face encounter with the patient, reviewing the data in the system, coordination of the care with the nursing staff as well as consultants, documentation, and entering orders.

## 2024-03-11 NOTE — PROGRESS NOTES
"Nathalia Jordan       LOS: 0 days   Patient Care Team:  Provider, No Known as PCP - General    Chief Complaint: Right foot/leg cellulitis    Subjective     Interval History:     Pain tolerable    Review of Systems:     Gen- No fevers, chills  CV- No chest pain, palpitations  Resp- No cough, dyspnea  GI- No N/V/D, abd pain    Objective     Vital Signs  Vital Signs (last 24 hours)         03/08 0700  03/09 0659 03/09 0700 03/09 0706   Most Recent      Temp (°F) 97.8 -  98.9       98.9 (37.2) 03/09 0314    Heart Rate 76 -  83       76 03/08 1535    Resp 16 -  20       18 03/09 0314    /69 -  155/60       155/60 03/09 0314    SpO2 (%) 98 -  100       98 03/08 1921    Flow (L/min)   2       2 03/09 0600              Physical Exam:    Cellulitis is improved.  No blistering on the foot or leg.     Results Review:     I reviewed the patient's new clinical results.    Medication Review:   Hospital Medications (active)         Dose Frequency Start End    ! Vancomycin level before 06:00 dose on [place] 3/10/24, hold dose until level checked by a pharmacist  Once 3/10/2024 --    Route: Does not apply    acetaminophen (TYLENOL) 160 MG/5ML oral solution 650 mg 650 mg Every 4 Hours PRN 3/8/2024 --    Admin Instructions: If given for fever, use fever parameter: fever greater than 100.4 °F  Based on patient request - if ordered for moderate or severe pain, provider allows for administration of a medication prescribed for a lower pain scale.    Do not exceed 4 grams of acetaminophen in a 24 hr period. Max dose of 2gm for AST/ALT greater than 120 units/L.    If given for pain, use the following pain scale:   Mild Pain = Pain Score of 1-3, CPOT 1-2  Moderate Pain = Pain Score of 4-6, CPOT 3-4  Severe Pain = Pain Score of 7-10, CPOT 5-8    Route: Oral    Linked Group 1: Placed in \"Or\" Linked Group        acetaminophen (TYLENOL) suppository 650 mg 650 mg Every 4 Hours PRN 3/8/2024 --    Admin Instructions: If given for fever, use " "fever parameter: fever greater than 100.4 °F  Based on patient request - if ordered for moderate or severe pain, provider allows for administration of a medication prescribed for a lower pain scale.    Do not exceed 4 grams of acetaminophen in a 24 hr period. Max dose of 2gm for AST/ALT greater than 120 units/L.    If given for pain, use the following pain scale:   Mild Pain = Pain Score of 1-3, CPOT 1-2  Moderate Pain = Pain Score of 4-6, CPOT 3-4  Severe Pain = Pain Score of 7-10, CPOT 5-8    Route: Rectal    Linked Group 1: Placed in \"Or\" Linked Group        acetaminophen (TYLENOL) tablet 650 mg 650 mg Every 4 Hours PRN 3/8/2024 --    Admin Instructions: If given for fever, use fever parameter: fever greater than 100.4 °F  Based on patient request - if ordered for moderate or severe pain, provider allows for administration of a medication prescribed for a lower pain scale.    Do not exceed 4 grams of acetaminophen in a 24 hr period. Max dose of 2gm for AST/ALT greater than 120 units/L.    If given for pain, use the following pain scale:   Mild Pain = Pain Score of 1-3, CPOT 1-2  Moderate Pain = Pain Score of 4-6, CPOT 3-4  Severe Pain = Pain Score of 7-10, CPOT 5-8    Route: Oral    Linked Group 1: Placed in \"Or\" Linked Group        aspirin EC tablet 81 mg 81 mg Daily 3/8/2024 --    Admin Instructions: Do not crush or chew the capsules or tablets. The drug may not work as designed if the capsule or tablet is crushed or chewed. Swallow whole.  Do not exceed 4 grams of aspirin in a 24 hr period.    If given for pain, use the following pain scale:   Mild Pain = Pain Score of 1-3, CPOT 1-2  Moderate Pain = Pain Score of 4-6, CPOT 3-4  Severe Pain = Pain Score of 7-10, CPOT 5-8    Route: Oral    atorvastatin (LIPITOR) tablet 40 mg 40 mg Daily 3/8/2024 --    Admin Instructions: Avoid grapefruit juice.    Route: Oral    bisacodyl (DULCOLAX) EC tablet 5 mg 5 mg Daily PRN 3/8/2024 --    Admin Instructions: Use if no bowel " "movement after 12 hours.  Swallow whole. Do not crush, split, or chew tablet.    Route: Oral    Linked Group 2: Placed in \"And\" Linked Group        bisacodyl (DULCOLAX) suppository 10 mg 10 mg Daily PRN 3/8/2024 --    Admin Instructions: Use if no bowel movement after 12 hours.  Hold for diarrhea    Route: Rectal    Linked Group 2: Placed in \"And\" Linked Group        buprenorphine-naloxone (SUBOXONE) 8-2 MG per SL tablet 1 tablet 1 tablet Daily 3/8/2024 --    Admin Instructions: Sublingual. Do not chew, crush, or swallow. Place under tongue and allow to dissolve. May take a small sip of water prior to placing under the tongue to aid dissolution.  If given for pain, use the following pain scale:  Mild Pain = Pain Score of 1-3, CPOT 1-2  Moderate Pain = Pain Score of 4-6, CPOT 3-4  Severe Pain = Pain Score of 7-10, CPOT 5-8    Route: Sublingual    busPIRone (BUSPAR) tablet 15 mg 15 mg 3 Times Daily 3/8/2024 --    Admin Instructions: Caution: Look alike/sound alike drug alert. Take with food.  Avoid grapefruit juice.    Route: Oral    cefTRIAXone (ROCEPHIN) 2,000 mg in sodium chloride 0.9 % 100 mL MBP 2,000 mg Every 24 Hours 3/8/2024 3/13/2024    Admin Instructions: LR should be paused and flushing of the line with NS is recommended prior to and after completion of ceftriaxone infusion due to incompatibility. Do not co-adminster with calcium-containing solutions.  Caution: Look alike/sound alike drug alert    Route: Intravenous    dextrose (D50W) (25 g/50 mL) IV injection 25 g 25 g Every 15 Minutes PRN 3/8/2024 --    Admin Instructions: Blood sugar less than 70; patient has IV access - Unresponsive, NPO or Unable To Safely Swallow    Route: Intravenous    dextrose (GLUTOSE) oral gel 15 g 15 g Every 15 Minutes PRN 3/8/2024 --    Admin Instructions: BS<70, Patient Alert, Is not NPO, Can safely swallow.    Route: Oral    DULoxetine (CYMBALTA) DR capsule 20 mg 20 mg Daily 3/8/2024 --    Admin Instructions: Do not crush or " chew the capsules or tablets. The drug may not work as designed if the capsule or tablet is crushed or chewed. Swallow whole.  Caution: Look alike/sound alike drug alert. Capsule may be opened and sprinkled on applesauce or apple juice. Do not crush or chew capsule.    Route: Oral    glucagon (GLUCAGEN) injection 1 mg 1 mg Every 15 Minutes PRN 3/8/2024 --    Admin Instructions: Blood Glucose Less Than 70 - Patient Without IV Access - Unresponsive, NPO or Unable To Safely Swallow  Reconstitute powder for injection by adding 1 mL of -supplied sterile diluent or sterile water for injection to a vial containing 1 mg of the drug, to provide solutions containing 1 mg/mL. Shake vial gently to dissolve.    Route: Intramuscular    insulin regular (humuLIN R,novoLIN R) injection 2-7 Units 2-7 Units Every 6 Hours Scheduled 3/8/2024 --    Admin Instructions: Correction Insulin - Low Dose - Total Insulin Dose Less Than 40 units/day (Lean, Elderly or Renal Patients)    Blood Glucose 150-199 mg/dL - 2 units  Blood Glucose 200-249 mg/dL - 3 units  Blood Glucose 250-299 mg/dL - 4 units  Blood Glucose 300-349 mg/dL - 5 units  Blood Glucose 350-400 mg/dL - 6 units  Blood Glucose Greater Than 400 mg/dL - 7 units & Call Provider   Caution: Look alike/sound alike drug alert    Route: Subcutaneous    ketorolac (TORADOL) injection 15 mg 15 mg Every 6 Hours PRN 3/8/2024 3/13/2024    Admin Instructions: Based on patient request - if ordered for moderate or severe pain, provider allows for administration of a medication prescribed for a lower pain scale.      If given for pain, use the following pain scale:  Mild Pain = Pain Score of 1-3, CPOT 1-2  Moderate Pain = Pain Score of 4-6, CPOT 3-4  Severe Pain = Pain Score of 7-10, CPOT 5-8    Route: Intravenous    lisinopril (PRINIVIL,ZESTRIL) tablet 10 mg ([Held by provider] since 3/8/2024 10:36 AM) 10 mg Daily 3/8/2024 --    Admin Instructions: Hold for SBP less than 100, DBP less  "than 60    Route: Oral    pantoprazole (PROTONIX) EC tablet 40 mg 40 mg Every Early Morning 3/8/2024 --    Admin Instructions: Swallow whole; do not crush, split, or chew.    Route: Oral    Pharmacy to dose vancomycin  Continuous PRN 3/8/2024 3/13/2024    Route: Does not apply    polyethylene glycol (MIRALAX) packet 17 g 17 g Daily PRN 3/8/2024 --    Admin Instructions: Use if no bowel movement after 12 hours. Mix in 6-8 ounces of water.  Use 4-8 ounces of water, tea, or juice for each 17 gram dose.    Route: Oral    Linked Group 2: Placed in \"And\" Linked Group        sennosides-docusate (PERICOLACE) 8.6-50 MG per tablet 2 tablet 2 tablet 2 Times Daily PRN 3/8/2024 --    Admin Instructions: Start bowel management regimen if patient has not had a bowel movement after 12 hours.    Route: Oral    Linked Group 2: Placed in \"And\" Linked Group        vancomycin (VANCOCIN) 1000 mg/200 mL dextrose 5% IVPB 1,000 mg Every 12 Hours 3/8/2024 3/13/2024    Route: Intravenous              Assessment & Plan     68-year-old male with right great toe callus/blister, small, right foot and right leg cellulitis.       Fever    Type 2 diabetes mellitus with diabetic autonomic neuropathy, without long-term current use of insulin    HTN (hypertension)    HLD (hyperlipidemia)    Bipolar affective disorder    Pain and swelling of right lower extremity    Cellulitis of right lower extremity    Thrombocytosis    He continues to improve clinically.  Plan to continue expectant observation.      Will follow.    SHOAIB Trivedi  03/11/24  08:06 EDT           "

## 2024-03-11 NOTE — NURSING NOTE
Pt transported to [facility] vehicle via W/C with his possessions to return to the [facility].

## 2024-03-11 NOTE — PROGRESS NOTES
Continued Stay Note  [facility]     Patient Name: Nathalia Jordan  MRN: [id number]  Today's Date: 3/11/2024    Admit Date: 3/7/2024        Discharge Plan       Row Name 03/11/24 1640       Plan    Final Discharge Disposition Code 01 - home or self-care    Final Note Mr. Sloan is being dishcarged from the hospital and he will return to the [facility]. Case management assisted with the [facility] for his medication through meds to bed. Case management can assist with a lyft to [address] [place] [address].                   Discharge Codes    No documentation.                 Expected Discharge Date and Time       Expected Discharge Date Expected Discharge Time    Mar 11, 2024               BREANN Darby

## 2024-03-11 NOTE — THERAPY DISCHARGE NOTE
Patient Name: Nathalia Jordan  : 1955    MRN: [id number]                              Today's Date: 3/11/2024       Admit Date: 3/7/2024    Visit Dx:     ICD-10-CM ICD-9-CM   1. Cellulitis of right leg  L03.115 682.6   2. Acute sepsis  A41.9 038.9     995.91   3. History of diabetes mellitus  Z86.39 V12.29   4. Osteomyelitis of great toe of left foot  M86.9 730.27   5. Diabetic foot ulcer with osteomyelitis  E11.621 250.80    E11.69 707.15    L97.509 730.27    M86.9 731.8   6. Type 2 diabetes mellitus with diabetic autonomic neuropathy, without long-term current use of insulin  E11.43 250.60     337.1   7. Cellulitis of right lower extremity  L03.115 682.6   8. Pain and swelling of right lower extremity  M79.604 729.5    M79.89 729.81   9. Chronic bilateral low back pain with right-sided sciatica  M54.41 724.2    G89.29 724.3     338.29     Patient Active Problem List   Diagnosis    Occasional tremors    Osteomyelitis of great toe of left foot    Opioid use disorder    MARTINA (generalized anxiety disorder)    Diabetic foot ulcer with osteomyelitis    Type 2 diabetes mellitus with diabetic autonomic neuropathy, without long-term current use of insulin    HTN (hypertension)    HLD (hyperlipidemia)    Bipolar affective disorder    Chronic bilateral low back pain with right-sided sciatica    Thrombocytosis     Past Medical History:   Diagnosis Date    Anxiety     Depression     Diabetes mellitus     Fibromyalgia, primary     Hypertension     Rheumatoid arthritis      Past Surgical History:   Procedure Laterality Date    AMPUTATION DIGIT Left 2023    Procedure: LEFT 1ST TOE AMPUTATION;  Surgeon: Yomaira Espinoza MD;  Location: [facility] OR;  Service: Vascular;  Laterality: Left;    CYST REMOVAL Right     right knee      General Information       Row Name 24 1346          Physical Therapy Time and Intention    Document Type discharge evaluation/summary  -LO     Mode of Treatment individual  therapy;physical therapy  -       Row Name 03/11/24 1346          General Information    Patient Profile Reviewed yes  -LO     Existing Precautions/Restrictions fall;other (see comments)  shoulder pain/limited ROM; L ankle pressure wound; chronic back pain, dorsal foot pain  -LO       Row Name 03/11/24 1346          Cognition    Orientation Status (Cognition) oriented x 3  -LO       Row Name 03/11/24 1346          Safety Issues, Functional Mobility    Impairments Affecting Function (Mobility) pain  -LO               User Key  (r) = Recorded By, (t) = Taken By, (c) = Cosigned By      Initials Name Provider Type    Rianna Champion, PT Physical Therapist                   Mobility       Row Name 03/11/24 1348          Bed Mobility    Comment, (Bed Mobility) standing in bathroom upon therapy arrival  -       Row Name 03/11/24 1348          Transfers    Comment, (Transfers) independent all  transfers  -       Row Name 03/11/24 1348          Sit-Stand Transfer    Sit-Stand [place] (Transfers) independent  -LO     Assistive Device (Sit-Stand Transfers) cane, straight  -LO       Row Name 03/11/24 1348          Gait/Stairs (Locomotion)    [place] Level (Gait) supervision  -LO     Assistive Device (Gait) cane, straight  -LO     Distance in Feet (Gait) 10+10  -LO     Deviations/Abnormal Patterns (Gait) edgar decreased;gait speed decreased  -LO     Bilateral Gait Deviations heel strike decreased  -LO     Comment, (Gait/Stairs) some pain at dorsal L foot with ambulation. Patient tried unloading shoe and CAM boot and prefers to return to podiatrist to see about his current boot.  -LO               User Key  (r) = Recorded By, (t) = Taken By, (c) = Cosigned By      Initials Name Provider Type    Rianna Champion PT Physical Therapist                   Obj/Interventions       Row Name 03/11/24 1350          Balance    Balance Assessment sitting static balance;sitting dynamic balance;standing static  balance;standing dynamic balance  -LO     Static Sitting Balance independent  -LO     Dynamic Sitting Balance independent  -LO     Position, Sitting Balance unsupported  -LO     Static Standing Balance independent  -LO     Dynamic Standing Balance supervision  -LO     Position/Device Used, Standing Balance supported;cane, straight  -LO     Comment, Balance supervision SPC in standing  -LO               User Key  (r) = Recorded By, (t) = Taken By, (c) = Cosigned By      Initials Name Provider Type    Rianna Champion, PT Physical Therapist                   Goals/Plan       Row Name 03/11/24 1354          Transfer Goal 1 (PT)    Activity/Assistive Device (Transfer Goal 1, PT) sit-to-stand/stand-to-sit;bed-to-chair/chair-to-bed  -LO     [place] Level/Cues Needed (Transfer Goal 1, PT) independent  -LO     Time Frame (Transfer Goal 1, PT) 10 days  -LO     Progress/Outcome (Transfer Goal 1, PT) goal met  -LO       Row Name 03/11/24 1354          Gait Training Goal 1 (PT)    Activity/Assistive Device (Gait Training Goal 1, PT) gait (walking locomotion);assistive device use;decrease asymmetrical patterns;decrease fall risk;improve balance and speed;increase endurance/gait distance;walker, rolling;cane, straight  -LO     [place] Level (Gait Training Goal 1, PT) modified independence  -LO     Distance (Gait Training Goal 1, PT) 300  -LO     Time Frame (Gait Training Goal 1, PT) 10 days  -LO     Progress/Outcome (Gait Training Goal 1, PT) goal partially met  -LO               User Key  (r) = Recorded By, (t) = Taken By, (c) = Cosigned By      Initials Name Provider Type    Rianna Champion PT Physical Therapist                   Clinical Impression       Row Name 03/11/24 1351          Pain    Additional Documentation Pain Scale: FACES Pre/Post-Treatment (Group)  -       Row Name 03/11/24 1351          Pain Scale: FACES Pre/Post-Treatment    Pain: FACES Scale, Pretreatment 2-->hurts little bit  -LO      Posttreatment Pain Rating 2-->hurts little bit  -LO     Pain Location - Side/Orientation Left  -     Pain Location - foot  -LO     Pre/Posttreatment Pain Comment dorsal foot  -LO       Row Name 03/11/24 1351          Plan of Care Review    Plan of Care Reviewed With patient  -     Progress improving  -     Outcome Evaluation Patient demonstrating supervision to independent for all transfers and ambulation with SPC without loss of balance this date. Gait distance limited by anterior ankle wound and dorsal foot discomfort. Patient tried unloading shoe and CAM boot to reduce foot pain but prefers to stay with his current boot which was prescribed by his podiatrist. No further IP PT skills warranted at this time. Recommend home at [place].  -       Row Name 03/11/24 1351          Therapy Assessment/Plan (PT)    Rehab Potential (PT) good, to achieve stated therapy goals  -     Criteria for Skilled Interventions Met (PT) no;no problems identified which require skilled intervention;does not meet criteria for skilled intervention  -     Therapy Frequency (PT) --  dc  -       Row Name 03/11/24 1351          Vital Signs    O2 Delivery Pre Treatment room air  -LO     O2 Delivery Intra Treatment room air  -LO     O2 Delivery Post Treatment room air  -LO     Pre Patient Position Standing  -LO     Intra Patient Position Sitting  -LO     Post Patient Position Sitting  -LO       Row Name 03/11/24 1351          Positioning and Restraints    Pre-Treatment Position standing in room  -LO     Post Treatment Position chair  -LO     In Chair notified nsg  -               User Key  (r) = Recorded By, (t) = Taken By, (c) = Cosigned By      Initials Name Provider Type    Rianna Champion, PT Physical Therapist                   Outcome Measures       Row Name 03/11/24 1355 03/11/24 1200       How much help from another person do you currently need...    Turning from your back to your side while in flat bed without using bedrails? 4   -LO 4  -DM    Moving from lying on back to sitting on the side of a flat bed without bedrails? 4  -LO 4  -DM    Moving to and from a bed to a chair (including a wheelchair)? 4  -LO 4  -DM    Standing up from a chair using your arms (e.g., wheelchair, bedside chair)? 4  -LO 4  -DM    Climbing 3-5 steps with a railing? 3  -LO 3  -DM    To walk in hospital room? 4  -LO 4  -DM    AM-PAC 6 Clicks Score (PT) 23  -LO 23  -DM    Highest Level of Mobility Goal 7 --> Walk 25 feet or more  -LO 7 --> Walk 25 feet or more  -DM      Row Name 03/11/24 0800 03/11/24 0200       How much help from another person do you currently need...    Turning from your back to your side while in flat bed without using bedrails? 4  -DM 4  -KS    Moving from lying on back to sitting on the side of a flat bed without bedrails? 4  -DM 4  -KS    Moving to and from a bed to a chair (including a wheelchair)? 4  -DM 4  -KS    Standing up from a chair using your arms (e.g., wheelchair, bedside chair)? 4  -DM 3  -KS    Climbing 3-5 steps with a railing? 3  -DM 3  -KS    To walk in hospital room? 4  -DM 3  -KS    AM-PAC 6 Clicks Score (PT) 23  -DM 21  -KS    Highest Level of Mobility Goal 7 --> Walk 25 feet or more  -DM 6 --> Walk 10 steps or more  -KS      Row Name 03/11/24 1355          Functional Assessment    Outcome Measure Options AM-PAC 6 Clicks Basic Mobility (PT)  -LO               User Key  (r) = Recorded By, (t) = Taken By, (c) = Cosigned By      Initials Name Provider Type    Rianna Champion, DEE Physical Therapist    Car Chapman, RN Registered Nurse    Kylie Lowe, RN Registered Nurse                  Physical Therapy Education       Title: PT OT SLP Therapies (Resolved)       Topic: Physical Therapy (Resolved)       Point: Mobility training (Resolved)       Learning Progress Summary             Patient Acceptance, E, VU,DU,NR by ML at 3/10/2024 1034    Comment: gait pattern with use of SPC                         Point: Home  exercise program (Resolved)       Learner Progress:  Not documented in this visit.              Point: Body mechanics (Resolved)       Learning Progress Summary             Patient Acceptance, E, VU,DU,NR by  at 3/10/2024 1034    Comment: gait pattern with use of SPC                         Point: Precautions (Resolved)       Learning Progress Summary             Patient Acceptance, E, VU,DU,NR by  at 3/10/2024 1034    Comment: gait pattern with use of SPC                                         User Key       Initials Effective Dates Name Provider Type Discipline     04/22/21 -  Ginny Mason Physical Therapist PT                  PT Recommendation and Plan     Plan of Care Reviewed With: patient  Progress: improving  Outcome Evaluation: Patient demonstrating supervision to independent for all transfers and ambulation with SPC without loss of balance this date. Gait distance limited by anterior ankle wound and dorsal foot discomfort. Patient tried unloading shoe and CAM boot to reduce foot pain but prefers to stay with his current boot which was prescribed by his podiatrist. No further IP PT skills warranted at this time. Recommend home at [place].     Time Calculation:         PT Charges       Row Name 03/11/24 1313             Time Calculation    Start Time 1313  -LO      PT Received On 03/11/24  -LO      PT Goal Re-Cert Due Date 03/20/24  -LO         Timed Charges    64337 - PT Therapeutic Activity Minutes 31  -LO         Total Minutes    Timed Charges Total Minutes 31  -LO       Total Minutes 31  -LO                User Key  (r) = Recorded By, (t) = Taken By, (c) = Cosigned By      Initials Name Provider Type     Rianna Fisher, PT Physical Therapist                  Therapy Charges for Today       Code Description Service Date Service Provider Modifiers Qty    87479232598 HC PT THERAPEUTIC ACT EA 15 MIN 3/11/2024 Rianna Fisher, PT GP 2            PT G-Codes  Outcome Measure Options: AM-PAC 6 Clicks Basic  Mobility (PT)  AM-PAC 6 Clicks Score (PT): 23  AM-PAC 6 Clicks Score (OT): 24    PT Discharge Summary  Anticipated Discharge Disposition (PT): home    Rianna Fisher, PT  3/11/2024

## 2024-03-12 ENCOUNTER — TRANSITIONAL CARE MANAGEMENT TELEPHONE ENCOUNTER (OUTPATIENT)
Dept: CALL CENTER | Facility: HOSPITAL | Age: 69
End: 2024-03-12
Payer: MEDICARE

## 2024-03-12 NOTE — OUTREACH NOTE
Call Center TCM Note      Flowsheet Row Responses   [facility] [place] patient discharged from? [facility]   Does the patient have one of the following disease processes/diagnoses(primary or secondary)? Other   TCM attempt successful? No   Unsuccessful attempts Attempt 1            Meghan Nolen LPN    3/12/2024, 08:53 EDT

## 2024-03-12 NOTE — OUTREACH NOTE
Call Center TCM Note      Flowsheet Row Responses   [facility] patient discharged from? [place]   Does the patient have one of the following disease processes/diagnoses(primary or secondary)? Other   TCM attempt successful? No   Unsuccessful attempts Attempt 2            Meghan Nolen LPN    3/12/2024, 15:44 EDT

## 2024-03-12 NOTE — OUTREACH NOTE
Prep Survey      Flowsheet Row Responses   [facility] [place] patient discharged from? [place]   Is LACE score < 7 ? No   Eligibility [facility]   Date of Admission 03/07/24   Date of Discharge 03/11/24   Discharge Disposition Home or Self Care   Discharge diagnosis Cellulitis of right lower extremity   Does the patient have one of the following disease processes/diagnoses(primary or secondary)? Other   Does the patient have Home health ordered? No   Is there a DME ordered? No   Comments regarding appointments new PCP appt   General alerts for this patient return to [facility]   Prep survey completed? Yes            Melany SEVERINO - Registered Nurse

## 2024-03-13 ENCOUNTER — TRANSITIONAL CARE MANAGEMENT TELEPHONE ENCOUNTER (OUTPATIENT)
Dept: CALL CENTER | Facility: HOSPITAL | Age: 69
End: 2024-03-13
Payer: MEDICARE

## 2024-03-13 LAB
BACTERIA SPEC AEROBE CULT: NORMAL
BACTERIA SPEC AEROBE CULT: NORMAL
QT INTERVAL: 388 MS
QTC INTERVAL: 388 MS

## 2024-03-13 NOTE — OUTREACH NOTE
Call Center TCM Note      Flowsheet Row Responses   [facility], [place], [facility] by [facility] patient discharged from? [place]   Does the patient have one of the following disease processes/diagnoses(primary or secondary)? Other   TCM attempt successful? No   Unsuccessful attempts Attempt 3            Masha Wallace LPN    3/13/2024, 10:19 EDT

## 2024-04-04 LAB
QT INTERVAL: 314 MS
QT INTERVAL: 360 MS
QTC INTERVAL: 409 MS
QTC INTERVAL: 415 MS